# Patient Record
Sex: MALE | Race: WHITE | Employment: OTHER | URBAN - METROPOLITAN AREA
[De-identification: names, ages, dates, MRNs, and addresses within clinical notes are randomized per-mention and may not be internally consistent; named-entity substitution may affect disease eponyms.]

---

## 2018-01-18 ENCOUNTER — OFFICE VISIT (OUTPATIENT)
Dept: FAMILY MEDICINE CLINIC | Age: 58
End: 2018-01-18

## 2018-01-18 VITALS
OXYGEN SATURATION: 95 % | RESPIRATION RATE: 14 BRPM | TEMPERATURE: 97.8 F | HEART RATE: 80 BPM | SYSTOLIC BLOOD PRESSURE: 122 MMHG | HEIGHT: 72 IN | BODY MASS INDEX: 34.81 KG/M2 | DIASTOLIC BLOOD PRESSURE: 60 MMHG | WEIGHT: 257 LBS

## 2018-01-18 DIAGNOSIS — M79.10 MYALGIA: ICD-10-CM

## 2018-01-18 DIAGNOSIS — J10.1 INFLUENZA A: Primary | ICD-10-CM

## 2018-01-18 PROCEDURE — G8417 CALC BMI ABV UP PARAM F/U: HCPCS | Performed by: NURSE PRACTITIONER

## 2018-01-18 PROCEDURE — 99213 OFFICE O/P EST LOW 20 MIN: CPT | Performed by: NURSE PRACTITIONER

## 2018-01-18 PROCEDURE — 87804 INFLUENZA ASSAY W/OPTIC: CPT | Performed by: NURSE PRACTITIONER

## 2018-01-18 PROCEDURE — G8484 FLU IMMUNIZE NO ADMIN: HCPCS | Performed by: NURSE PRACTITIONER

## 2018-01-18 PROCEDURE — G8427 DOCREV CUR MEDS BY ELIG CLIN: HCPCS | Performed by: NURSE PRACTITIONER

## 2018-01-18 PROCEDURE — 3017F COLORECTAL CA SCREEN DOC REV: CPT | Performed by: NURSE PRACTITIONER

## 2018-01-18 PROCEDURE — 1036F TOBACCO NON-USER: CPT | Performed by: NURSE PRACTITIONER

## 2018-01-18 RX ORDER — MOMETASONE FUROATE 1 MG/G
CREAM TOPICAL
COMMUNITY
Start: 2017-11-09

## 2018-01-18 RX ORDER — OSELTAMIVIR PHOSPHATE 75 MG/1
75 CAPSULE ORAL 2 TIMES DAILY
Qty: 10 CAPSULE | Refills: 0 | Status: SHIPPED | OUTPATIENT
Start: 2018-01-18 | End: 2018-01-23

## 2018-01-18 RX ORDER — ALLOPURINOL 100 MG/1
TABLET ORAL
COMMUNITY
Start: 2018-01-10

## 2018-01-18 NOTE — PROGRESS NOTES
pain and palpitations. Gastrointestinal: Negative for abdominal distention, abdominal pain, anal bleeding, blood in stool, constipation, diarrhea, nausea and vomiting. Genitourinary: Negative for dysuria. Musculoskeletal: Positive for myalgias. Negative for joint pain and neck pain. Skin: Negative for rash. Neurological: Positive for headaches. Objective:   /60   Pulse 80   Temp 97.8 °F (36.6 °C) (Temporal)   Resp 14   Ht 6' (1.829 m)   Wt 257 lb (116.6 kg)   SpO2 95%   BMI 34.86 kg/m²     Physical Exam   Constitutional: He is oriented to person, place, and time. Vital signs are normal. He appears well-developed and well-nourished. No distress. HENT:   Head: Normocephalic and atraumatic. Right Ear: External ear and ear canal normal. No drainage, swelling or tenderness. Tympanic membrane is not erythematous. A middle ear effusion is present. Left Ear: External ear and ear canal normal. No drainage, swelling or tenderness. Tympanic membrane is not erythematous. A middle ear effusion is present. Nose: Mucosal edema and rhinorrhea present. No sinus tenderness. Right sinus exhibits no maxillary sinus tenderness and no frontal sinus tenderness. Left sinus exhibits no maxillary sinus tenderness and no frontal sinus tenderness. Mouth/Throat: Uvula is midline and mucous membranes are normal. Posterior oropharyngeal edema and posterior oropharyngeal erythema present. No oropharyngeal exudate. Eyes: Conjunctivae, EOM and lids are normal. Pupils are equal, round, and reactive to light. Neck: Normal range of motion and full passive range of motion without pain. Neck supple. Cardiovascular: Normal rate, regular rhythm, S1 normal, S2 normal, normal heart sounds, intact distal pulses and normal pulses. No murmur heard. Pulmonary/Chest: Effort normal and breath sounds normal. No accessory muscle usage. No respiratory distress. He has no decreased breath sounds. He has no wheezes.  He

## 2018-01-19 LAB
INFLUENZA A ANTIBODY: ABNORMAL
INFLUENZA B ANTIBODY: ABNORMAL

## 2018-01-19 ASSESSMENT — ENCOUNTER SYMPTOMS
BLOOD IN STOOL: 0
CONSTIPATION: 0
NAUSEA: 0
VOMITING: 0
COUGH: 1
SINUS PAIN: 1
DIARRHEA: 0
SORE THROAT: 1
ABDOMINAL PAIN: 0
SHORTNESS OF BREATH: 0
ABDOMINAL DISTENTION: 0
RHINORRHEA: 1
WHEEZING: 0
SWOLLEN GLANDS: 1
ANAL BLEEDING: 0
CHEST TIGHTNESS: 0

## 2019-05-23 ENCOUNTER — OFFICE VISIT (OUTPATIENT)
Dept: PULMONOLOGY | Age: 59
End: 2019-05-23
Payer: COMMERCIAL

## 2019-05-23 VITALS
SYSTOLIC BLOOD PRESSURE: 120 MMHG | OXYGEN SATURATION: 96 % | RESPIRATION RATE: 16 BRPM | HEART RATE: 77 BPM | BODY MASS INDEX: 34.67 KG/M2 | HEIGHT: 72 IN | TEMPERATURE: 97.8 F | DIASTOLIC BLOOD PRESSURE: 76 MMHG | WEIGHT: 256 LBS

## 2019-05-23 DIAGNOSIS — Z99.89 OSA ON CPAP: Primary | ICD-10-CM

## 2019-05-23 DIAGNOSIS — E66.9 OBESITY (BMI 30-39.9): ICD-10-CM

## 2019-05-23 DIAGNOSIS — G47.33 OSA ON CPAP: Primary | ICD-10-CM

## 2019-05-23 PROBLEM — L50.3 DERMATOGRAPHIA: Status: ACTIVE | Noted: 2019-01-11

## 2019-05-23 PROCEDURE — 99204 OFFICE O/P NEW MOD 45 MIN: CPT | Performed by: INTERNAL MEDICINE

## 2019-05-23 RX ORDER — FEXOFENADINE HCL 180 MG/1
180 TABLET ORAL
COMMUNITY
Start: 2019-01-11

## 2019-05-23 ASSESSMENT — ENCOUNTER SYMPTOMS
SORE THROAT: 0
COUGH: 0
RHINORRHEA: 0
DIARRHEA: 0
CHEST TIGHTNESS: 0
SHORTNESS OF BREATH: 0
WHEEZING: 0
ABDOMINAL PAIN: 0
NAUSEA: 0
VOICE CHANGE: 0
VOMITING: 0
EYE ITCHING: 0

## 2019-05-23 NOTE — PROGRESS NOTES
Subjective:     Nathaly Earl is a 60 y.o. male who complains today of:     Chief Complaint   Patient presents with   [facility] Doctor     referred by Dr. Brandie Vasquez for RIKKI. HPI  Patient is using CPAP with  9  centimeters of H2O with heated humidity. He is on CPAP for last  5 yrs. He want new CPAP . He said he ha CPAP over 11 yrs old. He need CPAP supply. Patient is using CPAP for about   7-8 hours every night. Patient is using CPAP with  Full face   Mask. Patient said  sleep is restful with the CPAP use. Patient is compliant with CPAP therapy and benefiting with CPAP use. No snoring with CPAP use. No early morning headache. No complaint of daytime sleepiness or tiredness with CPAP use. Patient denies taking naps. No sleepiness with driving. Patient denies difficulty falling asleep or staying asleep. Allergies:  Sulfate  History reviewed. No pertinent past medical history. History reviewed. No pertinent surgical history. History reviewed. No pertinent family history.   Social History     Socioeconomic History    Marital status:      Spouse name: Not on file    Number of children: Not on file    Years of education: Not on file    Highest education level: Not on file   Occupational History    Not on file   Social Needs    Financial resource strain: Not on file    Food insecurity:     Worry: Not on file     Inability: Not on file    Transportation needs:     Medical: Not on file     Non-medical: Not on file   Tobacco Use    Smoking status: Never Smoker    Smokeless tobacco: Never Used   Substance and Sexual Activity    Alcohol use: Not on file    Drug use: Not on file    Sexual activity: Not on file   Lifestyle    Physical activity:     Days per week: Not on file     Minutes per session: Not on file    Stress: Not on file   Relationships    Social connections:     Talks on phone: Not on file     Gets together: Not on file     Attends Syn[employer] service: Not on file sleep on  sides. Avoid  sleep deprivation. Explained sleep hygiene. Advice to avoid Alcohol and sedative. Time spend over 25 min. Face to face. with greater than 50 % time with counseling regarding CPAP therapy. 2. Obesity (BMI 30-39. 9)  Patient patient is advised try to lose weight. obesity related risk explained to the patient ,  Current weight:  256 lb (116.1 kg) Lbs. BMI:  Body mass index is 34.72 kg/m². Return in about 6 months (around 11/23/2019) for leonel.       Colon Mohs, MD

## 2019-12-05 ENCOUNTER — OFFICE VISIT (OUTPATIENT)
Dept: PULMONOLOGY | Age: 59
End: 2019-12-05
Payer: COMMERCIAL

## 2019-12-05 VITALS
BODY MASS INDEX: 34.4 KG/M2 | HEART RATE: 72 BPM | OXYGEN SATURATION: 95 % | RESPIRATION RATE: 16 BRPM | HEIGHT: 72 IN | TEMPERATURE: 97.8 F | WEIGHT: 254 LBS | SYSTOLIC BLOOD PRESSURE: 126 MMHG | DIASTOLIC BLOOD PRESSURE: 64 MMHG

## 2019-12-05 DIAGNOSIS — Z99.89 OSA ON CPAP: Primary | ICD-10-CM

## 2019-12-05 DIAGNOSIS — E66.9 OBESITY (BMI 30-39.9): ICD-10-CM

## 2019-12-05 DIAGNOSIS — G47.33 OSA ON CPAP: Primary | ICD-10-CM

## 2019-12-05 PROCEDURE — 3017F COLORECTAL CA SCREEN DOC REV: CPT | Performed by: INTERNAL MEDICINE

## 2019-12-05 PROCEDURE — 1036F TOBACCO NON-USER: CPT | Performed by: INTERNAL MEDICINE

## 2019-12-05 PROCEDURE — 99214 OFFICE O/P EST MOD 30 MIN: CPT | Performed by: INTERNAL MEDICINE

## 2019-12-05 PROCEDURE — G8427 DOCREV CUR MEDS BY ELIG CLIN: HCPCS | Performed by: INTERNAL MEDICINE

## 2019-12-05 PROCEDURE — G8417 CALC BMI ABV UP PARAM F/U: HCPCS | Performed by: INTERNAL MEDICINE

## 2019-12-05 PROCEDURE — G8484 FLU IMMUNIZE NO ADMIN: HCPCS | Performed by: INTERNAL MEDICINE

## 2019-12-05 RX ORDER — PANTOPRAZOLE SODIUM 40 MG/1
TABLET, DELAYED RELEASE ORAL
Refills: 1 | COMMUNITY
Start: 2019-11-21 | End: 2021-07-28 | Stop reason: ALTCHOICE

## 2019-12-05 ASSESSMENT — ENCOUNTER SYMPTOMS
COUGH: 0
ABDOMINAL PAIN: 0
SHORTNESS OF BREATH: 0
SORE THROAT: 0
VOICE CHANGE: 0
VOMITING: 0
DIARRHEA: 0
WHEEZING: 0
CHEST TIGHTNESS: 0
RHINORRHEA: 0
NAUSEA: 0
EYE ITCHING: 0

## 2020-06-04 ENCOUNTER — VIRTUAL VISIT (OUTPATIENT)
Dept: PULMONOLOGY | Age: 60
End: 2020-06-04
Payer: COMMERCIAL

## 2020-06-04 VITALS — WEIGHT: 228 LBS | HEIGHT: 72 IN | BODY MASS INDEX: 30.88 KG/M2

## 2020-06-04 PROCEDURE — 99214 OFFICE O/P EST MOD 30 MIN: CPT | Performed by: INTERNAL MEDICINE

## 2020-06-04 ASSESSMENT — ENCOUNTER SYMPTOMS
COUGH: 0
CHEST TIGHTNESS: 0
DIARRHEA: 0
SHORTNESS OF BREATH: 0
SORE THROAT: 0
EYE ITCHING: 0
VOICE CHANGE: 0
ABDOMINAL PAIN: 0
WHEEZING: 0
VOMITING: 0
NAUSEA: 0
RHINORRHEA: 0

## 2020-06-04 NOTE — PROGRESS NOTES
Provider, MD   VITAMIN E PO Take by mouth  Historical Provider, MD   MAGNESIUM PO Take by mouth  Historical Provider, MD   CPAP Machine MISC by Does not apply route New CPAP at 9 cm      Dx RIKKI. Alirio Thacker MD   Respiratory Therapy Supplies PANDA New CPAP mask and supplies  Alirio Thacker MD   mometasone (ELOCON) 0.1 % cream   Historical Provider, MD   hyoscyamine (LEVSIN/SL) 125 MCG sublingual tablet   Historical Provider, MD   allopurinol (ZYLOPRIM) 100 MG tablet   Historical Provider, MD       Social History     Tobacco Use    Smoking status: Never Smoker    Smokeless tobacco: Never Used   Substance Use Topics    Alcohol use: Not on file    Drug use: Not on file        Allergies   Allergen Reactions    Bee Venom Anaphylaxis    Monosodium Glutamate Anaphylaxis    Sulfate Shortness Of Breath       PHYSICAL EXAMINATION:  [ INSTRUCTIONS:  \"[x]\" Indicates a positive item  \"[]\" Indicates a negative item  -- DELETE ALL ITEMS NOT EXAMINED]  Vital Signs: (As obtained by patient/caregiver or practitioner observation)    Blood pressure-  Heart rate-    Respiratory rate-    Temperature-  Pulse oximetry-     Constitutional: [x] Appears well-developed and well-nourished [x] No apparent distress      [] Abnormal-   Mental status  [x] Alert and awake  [x] Oriented to person/place/time [x]Able to follow commands      Eyes:  EOM    []  Normal  [] Abnormal-  Sclera  []  Normal  [] Abnormal -         Discharge []  None visible  [] Abnormal -    HENT:   [x] Normocephalic, atraumatic.   [] Abnormal   [] Mouth/Throat: Mucous membranes are moist.     External Ears [] Normal  [] Abnormal-     Neck: [x] No visualized mass     Pulmonary/Chest: [x] Respiratory effort normal.  [x] No visualized signs of difficulty breathing or respiratory distress        [] Abnormal-      Musculoskeletal:   [] Normal gait with no signs of ataxia         [] Normal range of motion of neck        [] Abnormal-       Neurological:        [x] No Facial Asymmetry (Cranial nerve 7 motor function) (limited exam to video visit)          [] No gaze palsy        [] Abnormal-         Skin:        [x] No significant exanthematous lesions or discoloration noted on facial skin         [] Abnormal-            Psychiatric:       [x] Normal Affect [x] No Hallucinations        [] Abnormal-     Other pertinent observable physical exam findings-     ASSESSMENT/PLAN:  1. RIKKI on CPAP  He is using CPAP with  9 centimeters of H2O with heated humidity. He is using CPAP for about  7-8 hours every night. He is using CPAP with Full face Mask. He said  sleep is restful with the CPAP use. He is compliant with CPAP therapy and benefiting with CPAP use. No snoring with CPAP use. Continue CPAP therapy as before    Counseling: CPAP/BiPAP uses, patient advised to use CPAP at least 5-6 hours every night. Driving: patient is advised for extreme caution when driving or operating machinery if there is a feeling of drowsiness, especially while driving it is preferable to stop driving and take a brief nap. Sleep hygiene:Avoid supine sleep, sleep on  sides. Avoid  sleep deprivation. Explained sleep hygiene. Advice to avoid Alcohol and sedative    2. Obesity (BMI 30-39. 9)  Patient patient is advised try to lose weight. obesity related risk explained to the patient ,  Current weight:  228 lb (103.4 kg) Lbs. BMI:  Body mass index is 30.92 kg/m². Suggested weight control approaches, including dietary changes , exercise, behavioral modification. Return in about 6 months (around 12/4/2020). Cathryn Camilo is a 63 y.o. male being evaluated by a Virtual Visit (video visit) encounter to address concerns as mentioned above. A caregiver was present when appropriate. Due to this being a TeleHealth encounter (During [facility]-15 public health emergency), evaluation of the following organ systems was limited: Vitals/Constitutional/EENT/Resp/CV/GI//MS/Neuro/Skin/Heme-Lymph-Imm.   Pursuant to the

## 2020-12-03 ENCOUNTER — VIRTUAL VISIT (OUTPATIENT)
Dept: PULMONOLOGY | Age: 60
End: 2020-12-03
Payer: COMMERCIAL

## 2020-12-03 PROCEDURE — 99214 OFFICE O/P EST MOD 30 MIN: CPT | Performed by: INTERNAL MEDICINE

## 2020-12-03 ASSESSMENT — ENCOUNTER SYMPTOMS
EYE ITCHING: 0
WHEEZING: 0
VOMITING: 0
VOICE CHANGE: 0
ABDOMINAL PAIN: 0
COUGH: 0
SORE THROAT: 0
RHINORRHEA: 0
NAUSEA: 0
CHEST TIGHTNESS: 0
SHORTNESS OF BREATH: 0
DIARRHEA: 0

## 2020-12-03 NOTE — PROGRESS NOTES
12/3/2020    TELEHEALTH EVALUATION -- Audio/Visual (During [facility]OP[facility]-18 public health emergency)    HPI:    Media Quivers (:  1960) has requested an audio/video evaluation for the following concern(s):    He is using CPAP with  9 centimeters of H2O with heated humidity. He is using CPAP for about   8  hours every night. He is using CPAP with  Full face Mask. He said  sleep is restful with the CPAP use. He is compliant with CPAP therapy and benefiting with CPAP use. No snoring with CPAP use. .    No complaint of daytime sleepiness or tiredness with CPAP use. He denies taking naps. No sleepiness with driving. He denies difficulty falling asleep or staying asleep. He does not need CPAP supply. Review of Systems   Constitutional: Negative for chills, diaphoresis, fatigue and fever. HENT: Negative for congestion, mouth sores, nosebleeds, postnasal drip, rhinorrhea, sneezing, sore throat and voice change. Eyes: Negative for itching and visual disturbance. Respiratory: Negative for cough, chest tightness, shortness of breath and wheezing. Cardiovascular: Negative. Negative for chest pain, palpitations and leg swelling. Gastrointestinal: Negative for abdominal pain, diarrhea, nausea and vomiting. Genitourinary: Negative for difficulty urinating and hematuria. Musculoskeletal: Negative for arthralgias, joint swelling and myalgias. Skin: Negative for rash. Allergic/Immunologic: Negative for environmental allergies. Neurological: Negative for dizziness, tremors, weakness and headaches. Psychiatric/Behavioral: Positive for sleep disturbance. Negative for behavioral problems. Prior to Visit Medications    Medication Sig Taking?  Authorizing Provider   pantoprazole (PROTONIX) 40 MG tablet Take 1 tablet by mouth once a day as directed  Historical Provider, MD   Respiratory Therapy Supplies PANDA [employer] CPAP mask and supplies  Susie Gutierrez MD   fexofenadine (ALLEGRA) 180 MG tablet Take 180 mg by mouth  Historical Provider, MD   VITAMIN E PO Take by mouth  Historical Provider, MD   MAGNESIUM PO Take by mouth  Historical Provider, MD   CPAP Machine MISC by Does not apply route New CPAP at 9 cm      Dx RIKKI. Nicole Davis MD   Respiratory Therapy Supplies PANDA New CPAP mask and supplies  Nicole Davis MD   mometasone (ELOCON) 0.1 % cream   Historical Provider, MD   hyoscyamine (LEVSIN/SL) 125 MCG sublingual tablet   Historical Provider, MD   allopurinol (ZYLOPRIM) 100 MG tablet   Historical Provider, MD       Social History     Tobacco Use    Smoking status: Never Smoker    Smokeless tobacco: Never Used   Substance Use Topics    Alcohol use: Not on file    Drug use: Not on file        Allergies   Allergen Reactions    Bee Venom Anaphylaxis    Monosodium Glutamate Anaphylaxis    Sulfate Shortness Of Breath       PHYSICAL EXAMINATION:  [ INSTRUCTIONS:  \"[x]\" Indicates a positive item  \"[]\" Indicates a negative item  -- DELETE ALL ITEMS NOT EXAMINED]  Vital Signs: (As obtained by patient/caregiver or practitioner observation)    Blood pressure-  Heart rate-    Respiratory rate-    Temperature-  Pulse oximetry-     Constitutional: [x] Appears well-developed and well-nourished [x] No apparent distress      [] Abnormal-   Mental status  [x] Alert and awake  [x] Oriented to person/place/time [x]Able to follow commands      Eyes:  EOM    [x]  Normal  [] Abnormal-  Sclera  []  Normal  [] Abnormal -         Discharge []  None visible  [] Abnormal -    HENT:   [x] Normocephalic, atraumatic.   [] Abnormal   [] Mouth/Throat: Mucous membranes are moist.     External Ears [x] Normal  [] Abnormal-     Neck: [x] No visualized mass     Pulmonary/Chest: [x] Respiratory effort normal.  [x] No visualized signs of difficulty breathing or respiratory distress        [] Abnormal-      Musculoskeletal:   [] Normal gait with no signs of ataxia         [] Normal range of motion of neck        [] Abnormal- Neurological:        [x] No Facial Asymmetry (Cranial nerve 7 motor function) (limited exam to video visit)          [] No gaze palsy        [] Abnormal-         Skin:        [x] No significant exanthematous lesions or discoloration noted on facial skin         [] Abnormal-            Psychiatric:       [x] Normal Affect [x] No Hallucinations        [] Abnormal-     Other pertinent observable physical exam findings-     ASSESSMENT/PLAN:  1. RIKKI on CPAP  He is using CPAP with  9 centimeters of H2O with heated humidity. He is using CPAP for about   8  hours every night. He is using CPAP with  Full face Mask. He said  sleep is restful with the CPAP use. He is compliant with CPAP therapy and benefiting with CPAP use. No snoring with CPAP use. .  Continue CPAp as before , no need for CPAP supply . Counseling: CPAP/BiPAP uses, He advised to use CPAP at least 5-6 hours every night. Driving: He is advised for extreme caution when driving or operating machinery if there is a feeling of drowsiness, especially while driving it is preferable to stop driving and take a brief nap. Sleep hygiene:Avoid supine sleep, sleep on  sides. Avoid  sleep deprivation. Explained sleep hygiene. Advice to avoid Alcohol and sedative    Time spend over 25 min. Face to face. with greater than 50 % time with counseling regarding CPAP therapy. 2. Obesity (BMI 30-39. 9)  He is advised try to lose weight. obesity related risk explained to the patient ,  Suggested weight control approaches, including dietary changes , exercise, behavioral modification. Return in about 6 months (around 6/3/2021) for rikki. Eric Albert is a 63 y.o. male being evaluated by a Virtual Visit (video visit) encounter to address concerns as mentioned above. A caregiver was present when appropriate.  Due to this being a TeleHealth encounter (During [facility]- public health emergency), evaluation of the following organ systems was limited:

## 2021-04-10 ENCOUNTER — HOSPITAL ENCOUNTER (EMERGENCY)
Age: 61
Discharge: HOME | End: 2021-04-10
Attending: EMERGENCY MEDICINE
Payer: COMMERCIAL

## 2021-04-10 VITALS
WEIGHT: 225 LBS | SYSTOLIC BLOOD PRESSURE: 142 MMHG | TEMPERATURE: 98.4 F | HEART RATE: 94 BPM | DIASTOLIC BLOOD PRESSURE: 79 MMHG | OXYGEN SATURATION: 97 % | HEIGHT: 72 IN | RESPIRATION RATE: 18 BRPM | BODY MASS INDEX: 30.48 KG/M2

## 2021-04-10 DIAGNOSIS — H16.002 CORNEAL ULCER OF LEFT EYE: Primary | ICD-10-CM

## 2021-04-10 PROCEDURE — 6370000000 HC RX 637 (ALT 250 FOR IP): Performed by: EMERGENCY MEDICINE

## 2021-04-10 PROCEDURE — 99283 EMERGENCY DEPT VISIT LOW MDM: CPT

## 2021-04-10 PROCEDURE — 6360000002 HC RX W HCPCS: Performed by: EMERGENCY MEDICINE

## 2021-04-10 PROCEDURE — 90471 IMMUNIZATION ADMIN: CPT | Performed by: EMERGENCY MEDICINE

## 2021-04-10 PROCEDURE — 90715 TDAP VACCINE 7 YRS/> IM: CPT | Performed by: EMERGENCY MEDICINE

## 2021-04-10 RX ORDER — CIPROFLOXACIN HYDROCHLORIDE 3.5 MG/ML
1 SOLUTION/ DROPS TOPICAL
Qty: 120 DROP | Refills: 0 | Status: SHIPPED | OUTPATIENT
Start: 2021-04-10 | End: 2021-04-20

## 2021-04-10 RX ORDER — HYDROCODONE BITARTRATE AND ACETAMINOPHEN 5; 325 MG/1; MG/1
1 TABLET ORAL EVERY 4 HOURS PRN
Qty: 18 TABLET | Refills: 0 | Status: SHIPPED | OUTPATIENT
Start: 2021-04-10 | End: 2021-04-13

## 2021-04-10 RX ORDER — HYDROCODONE BITARTRATE AND ACETAMINOPHEN 5; 325 MG/1; MG/1
1 TABLET ORAL ONCE
Status: COMPLETED | OUTPATIENT
Start: 2021-04-10 | End: 2021-04-10

## 2021-04-10 RX ADMIN — TETANUS TOXOID, REDUCED DIPHTHERIA TOXOID AND ACELLULAR PERTUSSIS VACCINE, ADSORBED 0.5 ML: 5; 2.5; 8; 8; 2.5 SUSPENSION INTRAMUSCULAR at 16:58

## 2021-04-10 RX ADMIN — FLUORESCEIN SODIUM 1 MG: 1 STRIP OPHTHALMIC at 17:11

## 2021-04-10 RX ADMIN — HYDROCODONE BITARTRATE AND ACETAMINOPHEN 1 TABLET: 5; 325 TABLET ORAL at 16:48

## 2021-04-10 ASSESSMENT — PAIN SCALES - GENERAL: PAINLEVEL_OUTOF10: 1

## 2021-04-10 ASSESSMENT — PAIN DESCRIPTION - FREQUENCY: FREQUENCY: CONTINUOUS

## 2021-04-10 NOTE — ED PROVIDER NOTES
 Sulfate Shortness Of Breath       FAMILY HISTORY    History reviewed. No pertinent family history. SOCIAL HISTORY    Social History     Socioeconomic History    Marital status:      Spouse name: None    Number of children: None    Years of education: None    Highest education level: None   Occupational History    None   Social Needs    Financial resource strain: None    Food insecurity     Worry: None     Inability: None    Transportation needs     Medical: None     Non-medical: None   Tobacco Use    Smoking status: Never Smoker    Smokeless tobacco: Never Used   Substance and Sexual Activity    Alcohol use:  Yes     Alcohol/week: 7.0 standard drinks     Types: 7 Glasses of wine per week    Drug use: None    Sexual activity: None   Lifestyle    Physical activity     Days per week: None     Minutes per session: None    Stress: None   Relationships    Social connections     Talks on phone: None     Gets together: None     Attends Confucianism service: None     Active member of club or organization: None     Attends meetings of clubs or organizations: None     Relationship status: None    Intimate partner violence     Fear of current or ex partner: None     Emotionally abused: None     Physically abused: None     Forced sexual activity: None   Other Topics Concern    None   Social History Narrative    None       REVIEW OF SYSTEMS    Constitutional:  Denies fever, chills, weight loss or weakness   Eyes: Complains of photophobia and pain in the left eye  HENT:  Denies sore throat or ear pain   Respiratory:  Denies cough or shortness of breath   Cardiovascular:  Denies chest pain, palpitations or swelling   GI:  Denies abdominal pain, nausea, vomiting, or diarrhea   Musculoskeletal:  Denies back pain   Skin:  Denies rash   Neurologic:  Denies headache, focal weakness or sensory changes   Endocrine:  Denies polyuria or polydypsia   Lymphatic:  Denies swollen glands   Psychiatric:  Denies depression, suicidal ideation or homicidal ideation   All systems negative except as marked. PHYSICAL EXAM    VITAL SIGNS: BP (!) 142/79   Pulse 94   Temp 98.4 °F (36.9 °C) (Temporal)   Resp 18   Ht 6' (1.829 m)   Wt 225 lb (102.1 kg)   SpO2 97%   BMI 30.52 kg/m²    Constitutional:  Well developed, Well nourished, mild acute distress, Non-toxic appearance. HENT:  Normocephalic, Atraumatic, Bilateral external ears normal, Oropharynx moist, No oral exudates, Nose normal. Neck- Normal range of motion, No tenderness, Supple, No stridor. Eyes:  PERRL, EOMI, left eye corneal ulcer present from 10:00 to 12 o'clock position, please see picture, conjunctiva injected, no discharge. No foreign body present  Respiratory:  Normal breath sounds, No respiratory distress, No wheezing, No chest tenderness. Cardiovascular:  Normal heart rate, Normal rhythm, No murmurs, No rubs, No gallops. GI:  Bowel sounds normal, Soft, No tenderness, No masses, No pulsatile masses. :  No CVA tenderness. Musculoskeletal:  Intact distal pulses, No edema, No tenderness, No cyanosis, No clubbing. Good range of motion in all major joints. No tenderness to palpation or major deformities noted. Back- No tenderness. Integument:  Warm, Dry, No erythema, No rash. Lymphatic:  No lymphadenopathy noted. Neurologic:  Alert & oriented x 3, Normal motor function, Normal sensory function, No focal deficits noted. Psychiatric:  Affect normal, Judgment normal, Mood normal.       REEVALUATION   Pain control adequate  Counseled patient to follow-up with ophthalmology next couple days.     Labs  Labs Reviewed - No data to display          Summation      Patient Course:     ED Medications administered this visit:    Medications   fluorescein ophthalmic strip 1 mg (has no administration in time range)   HYDROcodone-acetaminophen (NORCO) 5-325 MG per tablet 1 tablet (1 tablet Oral Given 4/10/21 4867)   Tetanus-Diphth-Acell Pertussis (BOOSTRIX) injection 0.5 mL (0.5 mLs Intramuscular Given 4/10/21 5182)       New Prescriptions from this visit:    New Prescriptions    CIPROFLOXACIN (CILOXAN) 0.3 % OPHTHALMIC SOLUTION    Place 1 drop into the left eye every 2 hours for 10 days    HYDROCODONE-ACETAMINOPHEN (NORCO) 5-325 MG PER TABLET    Take 1 tablet by mouth every 4 hours as needed for Pain for up to 3 days. Intended supply: 3 days. Take lowest dose possible to manage pain       Follow-up:  Wiley Ortiz [place]  [id number]  [phone]    Call in 1 day      [place] [id number] [phone]    Call in 1 day          Final Impression:   1.  Corneal ulcer of left eye               (Please note that portions of this note were completed with a voice recognition program.  Efforts were made to edit the dictations but occasionally words are mis-transcribed.)          Heaven Ortega MD  04/10/21 9818

## 2021-04-10 NOTE — ED TRIAGE NOTES
Patient to the ED today after he was working in his crawl space at home, patient states he had a lot of debris on his head and clothing and when he stood up, something fell into his left eye.

## 2021-06-04 ENCOUNTER — VIRTUAL VISIT (OUTPATIENT)
Dept: PULMONOLOGY | Age: 61
End: 2021-06-04
Payer: COMMERCIAL

## 2021-06-04 DIAGNOSIS — Z99.89 OSA ON CPAP: Primary | ICD-10-CM

## 2021-06-04 DIAGNOSIS — G47.33 OSA ON CPAP: Primary | ICD-10-CM

## 2021-06-04 DIAGNOSIS — E66.9 OBESITY (BMI 30-39.9): ICD-10-CM

## 2021-06-04 PROCEDURE — 99214 OFFICE O/P EST MOD 30 MIN: CPT | Performed by: INTERNAL MEDICINE

## 2021-06-04 ASSESSMENT — ENCOUNTER SYMPTOMS
SHORTNESS OF BREATH: 0
VOICE CHANGE: 0
SORE THROAT: 0
ABDOMINAL PAIN: 0
COUGH: 0
RHINORRHEA: 0
VOMITING: 0
CHEST TIGHTNESS: 0
DIARRHEA: 0
NAUSEA: 0
WHEEZING: 0
EYE ITCHING: 0

## 2021-06-04 NOTE — PROGRESS NOTES
2021    TELEHEALTH EVALUATION -- Audio/Visual (During WRGCZ-87 public health emergency)    HPI:    Ana Tinajero (:  1960) has requested an audio/video evaluation for the following concern(s):    He is using CPAP with  9   centimeters of H2O with heated humidity. He is using CPAP for about  8-9   hours every night. He is using CPAP with  Full face  Mask. He said  sleep is restful with the CPAP use. He is compliant with CPAP therapy and benefiting with CPAP use. No snoring with CPAP use. No complaint of daytime sleepiness or tiredness with CPAP use. He denies taking naps. No sleepiness with driving. He denies difficulty falling asleep or staying asleep. I reviewed compliance report with patient regarding CPAP therapy. He is using  CPAP for 30 days out of 30 days  Average usage of days used is 8hours and 9 min , average AHI 3.4 with CPAP use. Review of Systems   Constitutional: Negative for chills, diaphoresis, fatigue and fever. HENT: Negative for congestion, mouth sores, nosebleeds, postnasal drip, rhinorrhea, sneezing, sore throat and voice change. Eyes: Negative for itching and visual disturbance. Respiratory: Negative for cough, chest tightness, shortness of breath and wheezing. Cardiovascular: Negative. Negative for chest pain, palpitations and leg swelling. Gastrointestinal: Negative for abdominal pain, diarrhea, nausea and vomiting. Genitourinary: Negative for difficulty urinating and hematuria. Musculoskeletal: Negative for arthralgias, joint swelling and myalgias. Skin: Negative for rash. Allergic/Immunologic: Negative for environmental allergies. Neurological: Negative for dizziness, tremors, weakness and headaches. Psychiatric/Behavioral: Positive for sleep disturbance. Negative for behavioral problems. Prior to Visit Medications    Medication Sig Taking?  Authorizing Provider   pantoprazole (PROTONIX) 40 MG tablet Take 1 tablet by mouth once a day as directed  Historical Provider, MD   Respiratory Therapy Supplies PANDA New CPAP mask and supplies  Susanna Veloz MD   fexofenadine (ALLEGRA) 180 MG tablet Take 180 mg by mouth  Historical Provider, MD   VITAMIN E PO Take by mouth  Historical Provider, MD   MAGNESIUM PO Take by mouth  Historical Provider, MD   CPAP Machine MISC by Does not apply route New CPAP at 9 cm      Dx RIKKI. Susanna Veloz MD   Respiratory Therapy Supplies PANDA New CPAP mask and supplies  Susanna Veloz MD   mometasone (ELOCON) 0.1 % cream   Historical Provider, MD   hyoscyamine (LEVSIN/SL) 125 MCG sublingual tablet   Historical Provider, MD   allopurinol (ZYLOPRIM) 100 MG tablet   Historical Provider, MD       Social History     Tobacco Use    Smoking status: Never Smoker    Smokeless tobacco: Never Used   Substance Use Topics    Alcohol use: Yes     Alcohol/week: 7.0 standard drinks     Types: 7 Glasses of wine per week    Drug use: Not on file        Allergies   Allergen Reactions    Bee Venom Anaphylaxis    Monosodium Glutamate Anaphylaxis    Sulfate Shortness Of Breath       PHYSICAL EXAMINATION:  [ INSTRUCTIONS:  \"[x]\" Indicates a positive item  \"[]\" Indicates a negative item  -- DELETE ALL ITEMS NOT EXAMINED]  Vital Signs: (As obtained by patient/caregiver or practitioner observation)    Blood pressure-  Heart rate-    Respiratory rate-    Temperature-  Pulse oximetry-     Constitutional: [x] Appears well-developed and well-nourished [x] No apparent distress      [] Abnormal-   Mental status  [x] Alert and awake  [x] Oriented to person/place/time [x]Able to follow commands      Eyes:  EOM    [x]  Normal  [] Abnormal-  Sclera  []  Normal  [] Abnormal -         Discharge []  None visible  [] Abnormal -    HENT:   [x] Normocephalic, atraumatic.   [] Abnormal   [] Mouth/Throat: Mucous membranes are moist.     External Ears [] Normal  [] Abnormal-     Neck: [x] No visualized mass     Pulmonary/Chest: [x] Respiratory effort normal.  [x] No visualized signs of difficulty breathing or respiratory distress        [] Abnormal-      Musculoskeletal:   [] Normal gait with no signs of ataxia         [] Normal range of motion of neck        [] Abnormal-       Neurological:        [] No Facial Asymmetry (Cranial nerve 7 motor function) (limited exam to video visit)          [] No gaze palsy        [] Abnormal-         Skin:        [x] No significant exanthematous lesions or discoloration noted on facial skin         [] Abnormal-            Psychiatric:       [x] Normal Affect [x] No Hallucinations        [] Abnormal-     Other pertinent observable physical exam findings-     ASSESSMENT/PLAN:  1. RIKKI on CPAP  He is using CPAP with  9   centimeters of H2O with heated humidity. He is using CPAP for about  8-9   hours every night. He is using CPAP with  Full face  Mask. He said  sleep is restful with the CPAP use. He is compliant with CPAP therapy and benefiting with CPAP use. No snoring with CPAP use. No complaint of daytime sleepiness or tiredness with CPAP use. no need for CPAP supply     I reviewed compliance report with patient regarding CPAP therapy. He is using  CPAP for 30 days out of 30 days  Average usage of days used is 8hours and 9 min , average AHI 3.4 with CPAP use. Counseling: CPAP/BiPAP uses, He advised to use CPAP at least 5-6 hours every night. Driving: He is advised for extreme caution when driving or operating machinery if there is a feeling of drowsiness, especially while driving it is preferable to stop driving and take a brief nap. Sleep hygiene:Avoid supine sleep, sleep on  sides. Avoid  sleep deprivation. Explained sleep hygiene. Advice to avoid Alcohol and sedative    Time spend over 30 min. Face to face. with greater than 50 % time with counseling regarding CPAP therapy. 2. Obesity (BMI 30-39. 9)  He is advised try to lose weight.  obesity related risk explained to the patient ,  Suggested weight control approaches, including dietary changes , exercise, behavioral modification. Return in about 6 months (around 12/4/2021) for leonel. Dennis Torres, was evaluated through a synchronous (real-time) audio-video encounter. The patient (or guardian if applicable) is aware that this is a billable service. Verbal consent to proceed has been obtained within the past 12 months. The visit was conducted pursuant to the emergency declaration under the [address] [place] [address], [address] [place] [address] authority and the [employer] and [employer] General Act. Patient identification was verified, and a caregiver was present when appropriate. The patient was located in a state where the provider was credentialed to provide care. Total time spent on this encounter: 32 min    --Long Lewis MD on 6/4/2021 at 8:46 AM    An electronic signature was used to authenticate this note.

## 2021-07-28 ENCOUNTER — OFFICE VISIT (OUTPATIENT)
Dept: INTERNAL MEDICINE | Age: 61
End: 2021-07-28
Payer: COMMERCIAL

## 2021-07-28 VITALS
RESPIRATION RATE: 14 BRPM | WEIGHT: 232 LBS | BODY MASS INDEX: 31.46 KG/M2 | DIASTOLIC BLOOD PRESSURE: 64 MMHG | SYSTOLIC BLOOD PRESSURE: 112 MMHG | HEART RATE: 75 BPM | TEMPERATURE: 98.1 F | OXYGEN SATURATION: 95 %

## 2021-07-28 DIAGNOSIS — L70.0 BLACKHEAD: Primary | ICD-10-CM

## 2021-07-28 PROBLEM — L73.9 FOLLICULITIS: Status: ACTIVE | Noted: 2021-07-28

## 2021-07-28 PROCEDURE — 99203 OFFICE O/P NEW LOW 30 MIN: CPT | Performed by: NURSE PRACTITIONER

## 2021-07-28 ASSESSMENT — PATIENT HEALTH QUESTIONNAIRE - PHQ9
2. FEELING DOWN, DEPRESSED OR HOPELESS: 0
1. LITTLE INTEREST OR PLEASURE IN DOING THINGS: 0
SUM OF ALL RESPONSES TO PHQ9 QUESTIONS 1 & 2: 0
SUM OF ALL RESPONSES TO PHQ QUESTIONS 1-9: 0

## 2021-07-28 NOTE — PROGRESS NOTES
Fely Minaya (:  1960) is a 57 y.o. male, New patient, here for evaluation of the following chief complaint(s): Other (red raised pimple like bump on mid upper back concern it may be a tick)      Vitals:    21 1545   BP: 112/64   Pulse: 75   Resp: 14   Temp: 98.1 °F (36.7 °C)   SpO2: 95%       ASSESSMENT/PLAN:  1. Blackhead  -     mupirocin (BACTROBAN) 2 % ointment; Apply topically 3 times daily for 10 days. , Disp-1 Tube, R-0, Normal  -     Culture, Wound; Future      Return if symptoms worsen or fail to improve. SUBJECTIVE/OBJECTIVE:    Other  This is a new problem. The current episode started yesterday (Pt states he thinks he might have a tic in his back. Tender with black spot in middle surrounded by area of redness). The problem occurs constantly. The problem has been unchanged. Associated symptoms include a rash. Pertinent negatives include no arthralgias, chest pain, chills, congestion, coughing, fatigue, fever, myalgias, neck pain or sore throat. Nothing aggravates the symptoms. He has tried nothing for the symptoms. Review of Systems   Constitutional: Negative for chills, fatigue and fever. HENT: Negative for congestion, facial swelling, sore throat and trouble swallowing. Respiratory: Negative for cough, chest tightness, shortness of breath and wheezing. Cardiovascular: Negative for chest pain and palpitations. Musculoskeletal: Negative for arthralgias, myalgias and neck pain. Skin: Positive for rash. Negative for pallor and wound. Physical Exam  Vitals reviewed. Constitutional:       General: He is not in acute distress. Appearance: Normal appearance. HENT:      Mouth/Throat:      Lips: Pink. Mouth: Mucous membranes are moist.      Pharynx: Oropharynx is clear. No pharyngeal swelling or posterior oropharyngeal erythema. Cardiovascular:      Rate and Rhythm: Normal rate and regular rhythm.       Heart sounds: Normal heart sounds, S1 normal and S2 normal.   Pulmonary:      Effort: Pulmonary effort is normal. No respiratory distress. Breath sounds: Normal air entry. Skin:     General: Skin is warm and dry. Findings: Acne present. Comments: Pt has maculopapular areas on his back. There is a single \"blackhead\" looking erythematous spot just below the right scapula; removed by applying gentle pressure with a small amount of pus expressed. Culture obtained. Neurological:      Mental Status: He is alert and oriented to person, place, and time. Psychiatric:         Mood and Affect: Mood normal.         Behavior: Behavior is cooperative. An electronic signature was used to authenticate this note.     --Ger Lee, APRN

## 2021-07-28 NOTE — PATIENT INSTRUCTIONS
Patient Education        Folliculitis: Care Instructions  Overview     Folliculitis (say \"fbw-FJR-cau-LY-tus\") is an inflammation of the pouches (follicles) in the skin where hair grows. It can occur on any part of the body, but it is most common on the scalp, face, armpits, and groin. Bacteria, such as those found in a hot tub, can cause folliculitis. But folliculitis can also be caused by other organisms, such as fungi or parasites. Folliculitis begins as a red, tender area near a strand of hair. The skin can itch or burn and may drain pus or blood. Sometimes folliculitis can lead to more serious skin infections. Your doctor usually can treat mild folliculitis with an antibiotic cream or ointment. If you have folliculitis on your scalp, you may use a medicated shampoo. Antibiotics you take as pills can treat infections deeper in the skin. Other treatments that may be used include antifungal and antiparasitic medicines. Folliculitis may be caused by ingrown hairs from shaving. One solution is to stop shaving. If that isn't an option, using an electric razor that doesn't shave so close may help. Laser treatment may also be an option. Laser treatment destroys the hair follicle so hair will no longer grow in the treated area. Follow-up care is a key part of your treatment and safety. Be sure to make and go to all appointments, and call your doctor if you are having problems. It's also a good idea to know your test results and keep a list of the medicines you take. How can you care for yourself at home? · Take your medicine exactly as prescribed. If your doctor prescribed antibiotics, take them as directed. Do not stop taking them just because you feel better. You need to take the full course of antibiotics. · To help with itching or pain, put a warm, moist cloth (like a clean washcloth) on the area for 5 to 10 minutes, 3 to 6 times a day. · Do not share your razor, towel, or washcloth.  That can spread folliculitis. · If folliculitis is caused by shaving, try to avoid shaving for at least a month. If that isn't an option, use an electric razor that doesn't shave so close. Or if you need a clean shave, use shaving cream or gel and always shave in the direction that the hair grows. When should you call for help? Call your doctor now or seek immediate medical care if:    · You have symptoms of infection, such as:  ? Increased pain, swelling, warmth, or redness. ? Red streaks leading from the area. ? Pus draining from the area. ? A fever. Watch closely for changes in your health, and be sure to contact your doctor if:    · You do not get better as expected. Where can you learn more? Go to https://[email or web address].[email or web address] [email or web address] org and sign in to your [employer] account. Enter M257 in the ImageTag box to learn more about \"Folliculitis: Care Instructions. \"     If you do not have an account, please click on the \"Sign Up Now\" link. Current as of: March 3, 2021               Content Version: 12.9  © 1220-5738 [employer]. Care instructions adapted under license by [facility] [place] ([facility]). If you have questions about a medical condition or this instruction, always ask your healthcare professional. [address] any warranty or liability for your use of this information. Patient Education        Patient Education        Acne: Care Instructions  Overview  Acne is a skin problem. It shows up as blackheads, whiteheads, and pimples. Acne most often affects the face, neck, and upper body. It occurs when oil and dead skin cells clog the skin's pores. Acne usually starts during the teen years and often lasts into adulthood. Gentle cleansing every day controls most mild acne. If home treatment doesn't work, your doctor may prescribe a cream, an antibiotic, or a stronger medicine called isotretinoin.  Sometimes birth control pills help women who have monthly acne flare-ups. Follow-up care is a key part of your treatment and safety. Be sure to make and go to all appointments, and call your doctor if you are having problems. It's also a good idea to know your test results and keep a list of the medicines you take. How can you care for yourself at home? · Gently wash your face 1 or 2 times a day with warm (not hot) water and a mild soap or cleanser. Always rinse well. · Use an over-the-counter lotion or gel that contains benzoyl peroxide. Start with a small amount of 2.5% benzoyl peroxide and increase the strength as needed. Benzoyl peroxide works well for acne, but you may need to use it for up to 2 months before your acne starts to improve. · Apply acne cream, lotion, or gel to all the places you get pimples, blackheads, or whiteheads, not just where you have them now. Follow the instructions carefully. If your skin gets too dry and scaly or red and sore, reduce the amount. For the best results, apply medicines as directed. Try not to miss doses. · Do not squeeze or pick pimples and blackheads. This can cause infection and scarring. · Use only oil-free makeup, sunscreen, and other skin care products that will not clog your pores. · Wash your hair every day, and try to keep it off your face and shoulders. Consider pinning it back or cutting it short. When should you call for help? Watch closely for changes in your health, and be sure to contact your doctor if:    · You have tried home treatment for 6 to 8 weeks and your acne is not better or gets worse. Your doctor may need to add to or change your treatment.     · Your pimples become large and hard or filled with fluid.     · Scars form after pimples heal.     · You feel sad or hopeless, lack energy, or have other signs of depression while you are taking the prescription medicine isotretinoin.     · You start to have other symptoms, such as facial hair growth in women or bone and muscle pain.    Where can you learn more?  Go to https://chpepiceweb.[email or web address] org and sign in to your [employer] account. Enter V108 in the [facility] box to learn more about \"Acne: Care Instructions. \"     If you do not have an account, please click on the \"Sign Up Now\" link. Current as of: March 3, 2021               Content Version: 12.9  © 1229-2642 Healthwise, [facility]. Care instructions adapted under license by [facility] ([facility]). If you have questions about a medical condition or this instruction, always ask your healthcare professional. [place] [address] any warranty or liability for your use of this information.

## 2021-07-29 DIAGNOSIS — L73.9 FOLLICULITIS: ICD-10-CM

## 2021-07-29 ASSESSMENT — ENCOUNTER SYMPTOMS
SORE THROAT: 0
COUGH: 0
CHEST TIGHTNESS: 0
TROUBLE SWALLOWING: 0
SHORTNESS OF BREATH: 0
FACIAL SWELLING: 0
WHEEZING: 0

## 2021-08-01 LAB
GRAM STAIN RESULT: NORMAL
WOUND/ABSCESS: NORMAL

## 2021-12-06 ENCOUNTER — TELEPHONE (OUTPATIENT)
Dept: PULMONOLOGY | Age: 61
End: 2021-12-06

## 2021-12-06 DIAGNOSIS — G47.33 OSA ON CPAP: Primary | ICD-10-CM

## 2021-12-06 DIAGNOSIS — Z99.89 OSA ON CPAP: Primary | ICD-10-CM

## 2021-12-06 NOTE — TELEPHONE ENCOUNTER
Pt called asking for more CPAP supplies from [facility].  Thanks  LV  6/4/2021  FU  12/13/2021

## 2021-12-13 ENCOUNTER — VIRTUAL VISIT (OUTPATIENT)
Dept: PULMONOLOGY | Age: 61
End: 2021-12-13
Payer: COMMERCIAL

## 2021-12-13 DIAGNOSIS — E66.9 OBESITY (BMI 30-39.9): ICD-10-CM

## 2021-12-13 DIAGNOSIS — Z99.89 OSA ON CPAP: Primary | ICD-10-CM

## 2021-12-13 DIAGNOSIS — G47.33 OSA ON CPAP: Primary | ICD-10-CM

## 2021-12-13 PROCEDURE — 99214 OFFICE O/P EST MOD 30 MIN: CPT | Performed by: INTERNAL MEDICINE

## 2021-12-13 ASSESSMENT — ENCOUNTER SYMPTOMS
COUGH: 0
ABDOMINAL PAIN: 0
NAUSEA: 0
EYE ITCHING: 0
SORE THROAT: 0
VOICE CHANGE: 0
SHORTNESS OF BREATH: 0
DIARRHEA: 0
WHEEZING: 0
VOMITING: 0
CHEST TIGHTNESS: 0
RHINORRHEA: 0

## 2021-12-13 NOTE — PROGRESS NOTES
2021    TELEHEALTH EVALUATION -- Audio/Visual (During IRSMA-23 public health emergency)    HPI:    Polo Noel (:  1960) has requested an audio/video evaluation for the following concern(s):    He is using CPAP with   9 centimeters of H2O with heated humidity. He is using CPAP for about  8  hours every night. He is using CPAP with  Full  faceMask. He said  sleep is restful with the CPAP use. He is compliant with CPAP therapy and benefiting with CPAP use. No snoring with CPAP use. No complaint of daytime sleepiness or tiredness with CPAP use. He denies taking naps. No sleepiness with driving. He denies difficulty falling asleep or staying asleep. I reviewed compliance report with patient regarding CPAP therapy. He is using  CPAP for  days out of 30 days. Average usage of days used is 7 hours and 50 min , average AHI 2.5 with CPAP use. Review of Systems   Constitutional: Negative for chills, diaphoresis, fatigue and fever. HENT: Negative for congestion, mouth sores, nosebleeds, postnasal drip, rhinorrhea, sneezing, sore throat and voice change. Mucus in back in AM    Eyes: Negative for itching and visual disturbance. Respiratory: Negative for cough, chest tightness, shortness of breath and wheezing. Cardiovascular: Negative. Negative for chest pain, palpitations and leg swelling. Gastrointestinal: Negative for abdominal pain, diarrhea, nausea and vomiting. Genitourinary: Negative for difficulty urinating and hematuria. Musculoskeletal: Negative for arthralgias, joint swelling and myalgias. Skin: Negative for rash. Allergic/Immunologic: Negative for environmental allergies. Neurological: Negative for dizziness, tremors, weakness and headaches. Psychiatric/Behavioral: Negative for behavioral problems and sleep disturbance. Prior to Visit Medications    Medication Sig Taking?  Authorizing Provider   Respiratory Therapy Supplies PANDA New CPAP mask and supplies  Patsy Laird MD   fexofenadine (ALLEGRA) 180 MG tablet Take 180 mg by mouth  Historical Provider, MD   VITAMIN E PO Take by mouth  Historical Provider, MD   MAGNESIUM PO Take by mouth  Historical Provider, MD   CPAP Machine MISC by Does not apply route New CPAP at 9 cm      Dx RIKKI. Patsy Laird MD   mometasone (ELOCON) 0.1 % cream   Historical Provider, MD   hyoscyamine (LEVSIN/SL) 125 MCG sublingual tablet   Historical Provider, MD   allopurinol (ZYLOPRIM) 100 MG tablet   Historical Provider, MD       Social History     Tobacco Use    Smoking status: Never Smoker    Smokeless tobacco: Never Used   Substance Use Topics    Alcohol use: Yes     Alcohol/week: 7.0 standard drinks     Types: 7 Glasses of wine per week    Drug use: Not on file        Allergies   Allergen Reactions    Bee Venom Anaphylaxis    Monosodium Glutamate Anaphylaxis    Sulfate Shortness Of Breath    Sulfamethoxazole        PHYSICAL EXAMINATION:  [ INSTRUCTIONS:  \"[x]\" Indicates a positive item  \"[]\" Indicates a negative item  -- DELETE ALL ITEMS NOT EXAMINED]  Vital Signs: (As obtained by patient/caregiver or practitioner observation)    Blood pressure-  Heart rate-    Respiratory rate-    Temperature-  Pulse oximetry-     Constitutional: [x] Appears well-developed and well-nourished [x] No apparent distress      [] Abnormal-   Mental status  [x] Alert and awake  [x] Oriented to person/place/time []Able to follow commands      Eyes:  EOM    [x]  Normal  [] Abnormal-  Sclera  []  Normal  [] Abnormal -         Discharge []  None visible  [] Abnormal -    HENT:   [x] Normocephalic, atraumatic.   [] Abnormal   [] Mouth/Throat: Mucous membranes are moist.     External Ears [] Normal  [] Abnormal-     Neck: [x] No visualized mass     Pulmonary/Chest: [x] Respiratory effort normal.  [x] No visualized signs of difficulty breathing or respiratory distress        [] Abnormal-      Musculoskeletal:   [] Normal gait with no signs of ataxia         [] Normal range of motion of neck        [] Abnormal-       Neurological:        [x] No Facial Asymmetry (Cranial nerve 7 motor function) (limited exam to video visit)          [] No gaze palsy        [] Abnormal-         Skin:        [x] No significant exanthematous lesions or discoloration noted on facial skin         [] Abnormal-            Psychiatric:       [x] Normal Affect [x] No Hallucinations        [] Abnormal-     Other pertinent observable physical exam findings-     ASSESSMENT/PLAN:  1. RIKKI on CPAP   He is using CPAP with 9 centimeters of H2O with heated humidity. He is using CPAP for about  7-8  hours every night. He is using CPAP with  Full  faceMask. He said  sleep is restful with the CPAP use. He is compliant with CPAP therapy and benefiting with CPAP useNo snoring with CPAP use. continue CPP     I reviewed compliance report with patient regarding CPAP therapy. He is using  CPAP for  days out of 30 days. Average usage of days used is 7 hours and 50 min , average AHI 2.5 with CPAP use. Counseling: CPAP/BiPAP uses, He advised to use CPAP at least 5-6 hours every night. Driving: He is advised for extreme caution when driving or operating machinery if there is a feeling of drowsiness, especially while driving it is preferable to stop driving and take a brief nap. Sleep hygiene:Avoid supine sleep, sleep on  sides. Avoid  sleep deprivation. Explained sleep hygiene. Advice to avoid Alcohol and sedative    Time spend over 30 min. Face to face. with greater than 50 % time with CPAP therapy including review compliance, counseling and advised regarding CPAP therapy. 2. Obesity (BMI 30-39. 9)  He is advised try to lose weight. obesity related risk explained to the patient ,  Suggested weight control approaches, including dietary changes , exercise, behavioral modification. No follow-ups on file.     Raza Brooks, was evaluated through a synchronous (real-time) audio-video encounter. The patient (or guardian if applicable) is aware that this is a billable service. Verbal consent to proceed has been obtained within the past 12 months. The visit was conducted pursuant to the emergency declaration under the [address] [place] [address], [address] [place] [address] and the [employer] and [employer] General Act. Patient identification was verified, and a caregiver was present when appropriate. The patient was located in a state where the provider was credentialed to provide care. Total time spent on this encounter: 31 min    --Vickey Duran MD on 12/13/2021 at 9:35 AM    An electronic signature was used to authenticate this note.

## 2022-06-16 ENCOUNTER — OFFICE VISIT (OUTPATIENT)
Dept: PULMONOLOGY | Age: 62
End: 2022-06-16
Payer: COMMERCIAL

## 2022-06-16 VITALS
SYSTOLIC BLOOD PRESSURE: 119 MMHG | HEART RATE: 78 BPM | WEIGHT: 232 LBS | TEMPERATURE: 98.2 F | HEIGHT: 72 IN | DIASTOLIC BLOOD PRESSURE: 78 MMHG | OXYGEN SATURATION: 98 % | BODY MASS INDEX: 31.42 KG/M2

## 2022-06-16 DIAGNOSIS — E66.9 OBESITY (BMI 30-39.9): ICD-10-CM

## 2022-06-16 DIAGNOSIS — G47.33 OSA ON CPAP: Primary | ICD-10-CM

## 2022-06-16 DIAGNOSIS — Z99.89 OSA ON CPAP: Primary | ICD-10-CM

## 2022-06-16 PROCEDURE — 99214 OFFICE O/P EST MOD 30 MIN: CPT | Performed by: INTERNAL MEDICINE

## 2022-06-16 ASSESSMENT — ENCOUNTER SYMPTOMS
SORE THROAT: 0
CHEST TIGHTNESS: 0
VOICE CHANGE: 0
RHINORRHEA: 0
SHORTNESS OF BREATH: 0
DIARRHEA: 0
ABDOMINAL PAIN: 0
COUGH: 0
WHEEZING: 0
NAUSEA: 0
VOMITING: 0
EYE ITCHING: 0

## 2022-06-16 NOTE — PROGRESS NOTES
Subjective:     Fan Brown is a 60 y.o. male who complains today of:     Chief Complaint   Patient presents with    Sleep Apnea     6 month f/u       HPI  He He is using CPAP with   9 centimeters of H2O with heated humidity. He is using CPAP for about  7-8  hours every night. He is using CPAP with  Full  faceMask. He said  sleep is restful with the CPAP use. He is compliant with CPAP therapy and benefiting with CPAP use. No snoring with CPAP use. No complaint of daytime sleepiness or tiredness with CPAP use. He denies taking naps. No sleepiness with driving. He denies difficulty falling asleep or staying asleep. Need new CPAP supply      I reviewed compliance report with patient regarding CPAP therapy. He is using  CPAP for  days out of 30 days. Average usage of days used is 7 hours and 50 min , average AHI 2.5 with CPAP use    He has covid 19 may 2022, he said he was not sick . He took all covid shot    Allergies:  Bee venom, Monosodium glutamate, Sulfate, and Sulfamethoxazole  No past medical history on file. No past surgical history on file. No family history on file. Social History     Socioeconomic History    Marital status:      Spouse name: Not on file    Number of children: Not on file    Years of education: Not on file    Highest education level: Not on file   Occupational History    Not on file   Tobacco Use    Smoking status: Never Smoker    Smokeless tobacco: Never Used   Substance and Sexual Activity    Alcohol use:  Yes     Alcohol/week: 7.0 standard drinks     Types: 7 Glasses of wine per week    Drug use: Not on file    Sexual activity: Not on file   Other Topics Concern    Not on file   Social History Narrative    Not on file     Social Determinants of Health     Financial Resource Strain:     Difficulty of Paying Living Expenses: Not on file   Food Insecurity:     Worried About Running Out of Food in the Last Year: Not on file    [address] in the Last Year: Not on file   Transportation Needs:     Lack of Transportation (Medical): Not on file    Lack of Transportation (Non-Medical): Not on file   Physical Activity:     Days of Exercise per Week: Not on file    Minutes of Exercise per Session: Not on file   Stress:     Feeling of Stress : Not on file   Social Connections:     Frequency of Communication with Friends and Family: Not on file    Frequency of Social Gatherings with Friends and Family: Not on file    Attends Yarsani Services: Not on file    Active Member of [address] [place] [address] Responsys or Organizations: Not on file    Attends Club or Organization Meetings: Not on file    Marital Status: Not on file   Intimate Partner Violence:     Fear of Current or Ex-Partner: Not on file    Emotionally Abused: Not on file    Physically Abused: Not on file    Sexually Abused: Not on file   Housing Stability:     Unable to Pay for Housing in the Last Year: Not on file    Number of [place] in the Last Year: Not on file    Unstable Housing in the Last Year: Not on file         Review of Systems   Constitutional: Negative for chills, diaphoresis, fatigue and fever. HENT: Negative for congestion, mouth sores, nosebleeds, postnasal drip, rhinorrhea, sneezing, sore throat and voice change. Eyes: Negative for itching and visual disturbance. Respiratory: Negative for cough, chest tightness, shortness of breath and wheezing. Cardiovascular: Negative. Negative for chest pain, palpitations and leg swelling. Gastrointestinal: Negative for abdominal pain, diarrhea, nausea and vomiting. Genitourinary: Negative for difficulty urinating and hematuria. Musculoskeletal: Negative for arthralgias, joint swelling and myalgias. Skin: Negative for rash. Allergic/Immunologic: Negative for environmental allergies. Neurological: Negative for dizziness, tremors, weakness and headaches. Psychiatric/Behavioral: Positive for sleep disturbance.  Negative for behavioral problems. :     Vitals:    06/16/22 0924   BP: 119/78   Pulse: 78   Temp: 98.2 °F (36.8 °C)   SpO2: 98%   Weight: 232 lb (105.2 kg)   Height: 6' (1.829 m)     Wt Readings from Last 3 Encounters:   06/16/22 232 lb (105.2 kg)   07/28/21 232 lb (105.2 kg)   04/10/21 225 lb (102.1 kg)         Physical Exam  Constitutional:       Appearance: He is well-developed. He is obese. HENT:      Head: Normocephalic and atraumatic. Nose: Nose normal.   Eyes:      Conjunctiva/sclera: Conjunctivae normal.      Pupils: Pupils are equal, round, and reactive to light. Neck:      Thyroid: No thyromegaly. Vascular: No JVD. Trachea: No tracheal deviation. Cardiovascular:      Rate and Rhythm: Normal rate and regular rhythm. Heart sounds: No murmur heard. No friction rub. No gallop. Pulmonary:      Effort: Pulmonary effort is normal. No respiratory distress. Breath sounds: Normal breath sounds. No wheezing or rales. Chest:      Chest wall: No tenderness. Abdominal:      General: There is no distension. Musculoskeletal:         General: Normal range of motion. Lymphadenopathy:      Cervical: No cervical adenopathy. Skin:     General: Skin is warm and dry. Findings: No rash. Neurological:      Mental Status: He is alert and oriented to person, place, and time. Cranial Nerves: No cranial nerve deficit.    Psychiatric:         Behavior: Behavior normal.         Current Outpatient Medications   Medication Sig Dispense Refill    Respiratory Therapy Supplies PANDA New CPAP mask and supplies 1 each 0    Respiratory Therapy Supplies PANDA New CPAP mask and supplies 1 each 0    fexofenadine (ALLEGRA) 180 MG tablet Take 180 mg by mouth      VITAMIN E PO Take by mouth      MAGNESIUM PO Take by mouth      CPAP Machine MISC by Does not apply route New CPAP at 9 cm      Dx RIKKI. 1 each 0    mometasone (ELOCON) 0.1 % cream       hyoscyamine (LEVSIN/SL) 125 MCG sublingual tablet       allopurinol (ZYLOPRIM) 100 MG tablet        No current facility-administered medications for this visit. Assessment/Plan:     1. RIKKI on CPAP  He He is using CPAP with   9 centimeters of H2O with heated humidity. He is using CPAP for about  7-8  hours every night. He is using CPAP with  Full  faceMask. He said  sleep is restful with the CPAP use. He is compliant with CPAP therapy and benefiting with CPAP use. No snoring with CPAP use. Need new CPAP supply . Continue CPAP as before.      I reviewed compliance report with patient regarding CPAP therapy. He is using  CPAP for  days out of 30 days. Average usage of days used is 7 hours and 50 min , average AHI 2.5 with CPAP use- Respiratory Therapy Supplies PANDA; New CPAP mask and supplies  Dispense: 1 each; Refill: 0    Counseling: CPAP/BiPAP uses, He advised to use CPAP at least 5-6 hours every night. Driving: He is advised for extreme caution when driving or operating machinery if there is a feeling of drowsiness, especially while driving it is preferable to stop driving and take a brief nap. Sleep hygiene:Avoid supine sleep, sleep on  sides. Avoid  sleep deprivation. Explained sleep hygiene. Advice to avoid Alcohol and sedative    Time spend over 30 min. Face to face. with greater than 50 % time with CPAP therapy including review compliance, counseling and advised regarding CPAP therapy. 2. Obesity (BMI 30-39. 9)  He is advised try to lose weight. obesity related risk explained to the patient ,  Current weight:  232 lb (105.2 kg) Lbs. BMI:  Body mass index is 31.46 kg/m². Suggested weight control approaches, including dietary changes , exercise, behavioral modification. Return in about 6 months (around 12/16/2022) for rikki.       Vandana Calhoun MD

## 2022-09-11 ENCOUNTER — OFFICE VISIT (OUTPATIENT)
Dept: INTERNAL MEDICINE | Age: 62
End: 2022-09-11
Payer: COMMERCIAL

## 2022-09-11 VITALS
BODY MASS INDEX: 32.28 KG/M2 | HEART RATE: 89 BPM | SYSTOLIC BLOOD PRESSURE: 128 MMHG | OXYGEN SATURATION: 96 % | TEMPERATURE: 98.2 F | WEIGHT: 238 LBS | DIASTOLIC BLOOD PRESSURE: 60 MMHG

## 2022-09-11 DIAGNOSIS — L30.9 DERMATITIS: Primary | ICD-10-CM

## 2022-09-11 PROCEDURE — 96372 THER/PROPH/DIAG INJ SC/IM: CPT | Performed by: NURSE PRACTITIONER

## 2022-09-11 PROCEDURE — 99213 OFFICE O/P EST LOW 20 MIN: CPT | Performed by: NURSE PRACTITIONER

## 2022-09-11 RX ORDER — MELOXICAM 15 MG/1
15 TABLET ORAL DAILY
COMMUNITY

## 2022-09-11 RX ORDER — TRIAMCINOLONE ACETONIDE 40 MG/ML
80 INJECTION, SUSPENSION INTRA-ARTICULAR; INTRAMUSCULAR ONCE
Status: COMPLETED | OUTPATIENT
Start: 2022-09-11 | End: 2022-09-11

## 2022-09-11 RX ADMIN — TRIAMCINOLONE ACETONIDE 80 MG: 40 INJECTION, SUSPENSION INTRA-ARTICULAR; INTRAMUSCULAR at 11:16

## 2022-09-11 SDOH — ECONOMIC STABILITY: FOOD INSECURITY: WITHIN THE PAST 12 MONTHS, THE FOOD YOU BOUGHT JUST DIDN'T LAST AND YOU DIDN'T HAVE MONEY TO GET MORE.: NEVER TRUE

## 2022-09-11 SDOH — ECONOMIC STABILITY: FOOD INSECURITY: WITHIN THE PAST 12 MONTHS, YOU WORRIED THAT YOUR FOOD WOULD RUN OUT BEFORE YOU GOT MONEY TO BUY MORE.: NEVER TRUE

## 2022-09-11 ASSESSMENT — ENCOUNTER SYMPTOMS
SHORTNESS OF BREATH: 0
CHEST TIGHTNESS: 0
TROUBLE SWALLOWING: 0
COUGH: 0
SORE THROAT: 0
WHEEZING: 0
FACIAL SWELLING: 0

## 2022-09-11 ASSESSMENT — SOCIAL DETERMINANTS OF HEALTH (SDOH): HOW HARD IS IT FOR YOU TO PAY FOR THE VERY BASICS LIKE FOOD, HOUSING, MEDICAL CARE, AND HEATING?: NOT HARD AT ALL

## 2022-09-11 NOTE — PROGRESS NOTES
Raza Brooks (:  1960) is a 60 y.o. male, Established patient, here for evaluation of the following chief complaint(s): Other (Rash on belly and legs since wed)      Vitals:    22 1011   BP: 128/60   Pulse: 89   Temp: 98.2 °F (36.8 °C)   SpO2: 96%       ASSESSMENT/PLAN:  1. Dermatitis  -     triamcinolone acetonide (KENALOG-40) injection 80 mg; 80 mg, IntraMUSCular, ONCE, 1 dose, On Sun 22 at 1045        -      may use OTC antihistamine for itching    Return if symptoms worsen or fail to improve. SUBJECTIVE/OBJECTIVE:    Other  This is a new problem. Episode onset: x4 days, rash on waist and groin, itchy. pt has hx of contact dermatitis and takes Allegra daily. put chemicals to close pool and had to get in water after doing so to center the tube but also pruned some trees. The problem occurs constantly. The problem has been gradually worsening. Associated symptoms include a rash. Pertinent negatives include no arthralgias, chest pain, chills, congestion, coughing, fatigue, fever, myalgias, neck pain or sore throat. Treatments tried: steroid cream, nystatin, benadryl. Review of Systems   Constitutional:  Negative for chills, fatigue and fever. HENT:  Negative for congestion, facial swelling, sore throat and trouble swallowing. Respiratory:  Negative for cough, chest tightness, shortness of breath and wheezing. Cardiovascular:  Negative for chest pain and palpitations. Musculoskeletal:  Negative for arthralgias, myalgias and neck pain. Skin:  Positive for rash. Negative for pallor and wound. Physical Exam  Vitals reviewed. Constitutional:       General: He is not in acute distress. Appearance: Normal appearance. HENT:      Mouth/Throat:      Lips: Pink. Mouth: Mucous membranes are moist.      Pharynx: Oropharynx is clear. No pharyngeal swelling or posterior oropharyngeal erythema. Cardiovascular:      Rate and Rhythm: Normal rate and regular rhythm.

## 2022-12-19 ENCOUNTER — TELEMEDICINE (OUTPATIENT)
Dept: PULMONOLOGY | Age: 62
End: 2022-12-19
Payer: COMMERCIAL

## 2022-12-19 DIAGNOSIS — E66.9 OBESITY (BMI 30-39.9): ICD-10-CM

## 2022-12-19 DIAGNOSIS — Z99.89 OSA ON CPAP: Primary | ICD-10-CM

## 2022-12-19 DIAGNOSIS — G47.33 OSA ON CPAP: Primary | ICD-10-CM

## 2022-12-19 PROCEDURE — 99443 PR PHYS/QHP TELEPHONE EVALUATION 21-30 MIN: CPT | Performed by: INTERNAL MEDICINE

## 2022-12-19 RX ORDER — NYSTATIN 100000 [USP'U]/G
POWDER TOPICAL
COMMUNITY
Start: 2022-12-06

## 2022-12-19 RX ORDER — HYDROXYCHLOROQUINE SULFATE 200 MG/1
TABLET, FILM COATED ORAL
COMMUNITY
Start: 2022-12-15

## 2022-12-19 ASSESSMENT — ENCOUNTER SYMPTOMS
VOICE CHANGE: 0
COUGH: 0
SHORTNESS OF BREATH: 0
RHINORRHEA: 0
EYE ITCHING: 0
NAUSEA: 0
WHEEZING: 0
SORE THROAT: 0
DIARRHEA: 0
CHEST TIGHTNESS: 0
VOMITING: 0
ABDOMINAL PAIN: 0

## 2022-12-19 NOTE — PROGRESS NOTES
Krzysztof Mendoza (:  1960) is a Established patient, here for evaluation of the following:    Assessment & Plan   Below is the assessment and plan developed based on review of pertinent history, physical exam, labs, studies, and medications. 1. RIKKI on CPAP  2. Obesity (BMI 30-39. 9)    He is using CPAP with 9 cm of H2O for about 7 to 8 hours every night with full facemask and sleep well with the CPAP therapy. He said he does not snore when you use a CPAP and he does not take naps and he does not have any sleepiness while driving. He is trying to lose weight but not much success he said he weighed about 235 pounds. He needs CPAP supply order. I reviewed compliance report he uses 100% of the time average use is 7 hours 53 minutes and AHI is 1.8. I will follow him in 6-month earlier if needed. Return in about 6 months (around 2023) for rikki. Subjective   HPI  He He is using CPAP with   9 centimeters of H2O with heated humidity. He is using CPAP for about  6-8 hours every night. He is using CPAP with  Full face Mask. He said  sleep is restful with the CPAP use. He is compliant with CPAP therapy and benefiting with CPAP use. No snoring with CPAP use. No complaint of daytime sleepiness or tiredness with CPAP use. He denies taking naps. No sleepiness with driving. He denies difficulty falling asleep or staying asleep. Need new CPAP supply , need to send order      I reviewed compliance report with patient regarding CPAP therapy. He is using  CPAP for  days out of 30 days. Average usage of days used is 7 hours and 53 min , average AHI 1.8 with CPAP use    Review of Systems   Constitutional:  Negative for chills, diaphoresis, fatigue and fever. HENT:  Negative for congestion, mouth sores, nosebleeds, postnasal drip, rhinorrhea, sneezing, sore throat and voice change. Eyes:  Negative for itching and visual disturbance.    Respiratory:  Negative for cough, chest tightness, shortness of breath and wheezing. Cardiovascular: Negative. Negative for chest pain, palpitations and leg swelling. Gastrointestinal:  Negative for abdominal pain, diarrhea, nausea and vomiting. Genitourinary:  Negative for difficulty urinating and hematuria. Musculoskeletal:  Negative for arthralgias, joint swelling and myalgias. Skin:  Negative for rash. Allergic/Immunologic: Negative for environmental allergies. Neurological:  Negative for dizziness, tremors, weakness and headaches. Psychiatric/Behavioral:  Positive for sleep disturbance. Negative for behavioral problems. Objective   Patient-Reported Vitals  No data recorded     Physical Exam phone visit          On this date 12/19/2022 I have spent 22 minutes reviewing previous notes, test results and face to face (virtual) with the patient discussing the diagnosis and importance of compliance with the treatment plan as well as documenting on the day of the visit. Trace Palacio was evaluated through a synchronous (real-time) audio-video encounter. The patient (or guardian if applicable) is aware that this is a billable service, which includes applicable co-pays. This Virtual Visit was conducted with patient's (and/or legal guardian's) consent. The visit was conducted pursuant to the emergency declaration under the [address] [place] [address], [address] [place] [address] authority and the [employer] General Act. Patient identification was verified, and a caregiver was present when appropriate. The patient was located at Home: [address] [place] [address]  [address] [address]. Provider was located at [place] [address]): [address].         --Kristopher Young MD

## 2023-04-04 LAB
ALANINE AMINOTRANSFERASE (SGPT) (U/L) IN SER/PLAS: 16 U/L (ref 10–52)
ALBUMIN (G/DL) IN SER/PLAS: 4.1 G/DL (ref 3.4–5)
ALKALINE PHOSPHATASE (U/L) IN SER/PLAS: 51 U/L (ref 33–136)
ANION GAP IN SER/PLAS: 11 MMOL/L (ref 10–20)
ASPARTATE AMINOTRANSFERASE (SGOT) (U/L) IN SER/PLAS: 18 U/L (ref 9–39)
BASOPHILS (10*3/UL) IN BLOOD BY AUTOMATED COUNT: 0.05 X10E9/L (ref 0–0.1)
BASOPHILS/100 LEUKOCYTES IN BLOOD BY AUTOMATED COUNT: 0.7 % (ref 0–2)
BILIRUBIN TOTAL (MG/DL) IN SER/PLAS: 1 MG/DL (ref 0–1.2)
CALCIUM (MG/DL) IN SER/PLAS: 9.2 MG/DL (ref 8.6–10.3)
CARBON DIOXIDE, TOTAL (MMOL/L) IN SER/PLAS: 27 MMOL/L (ref 21–32)
CHLORIDE (MMOL/L) IN SER/PLAS: 107 MMOL/L (ref 98–107)
CHOLESTEROL (MG/DL) IN SER/PLAS: 161 MG/DL (ref 0–199)
CHOLESTEROL IN HDL (MG/DL) IN SER/PLAS: 55 MG/DL
CHOLESTEROL/HDL RATIO: 2.9
CREATININE (MG/DL) IN SER/PLAS: 1.15 MG/DL (ref 0.5–1.3)
EOSINOPHILS (10*3/UL) IN BLOOD BY AUTOMATED COUNT: 0.31 X10E9/L (ref 0–0.7)
EOSINOPHILS/100 LEUKOCYTES IN BLOOD BY AUTOMATED COUNT: 4.1 % (ref 0–6)
ERYTHROCYTE DISTRIBUTION WIDTH (RATIO) BY AUTOMATED COUNT: 13.2 % (ref 11.5–14.5)
ERYTHROCYTE MEAN CORPUSCULAR HEMOGLOBIN CONCENTRATION (G/DL) BY AUTOMATED: 33.3 G/DL (ref 32–36)
ERYTHROCYTE MEAN CORPUSCULAR VOLUME (FL) BY AUTOMATED COUNT: 94 FL (ref 80–100)
ERYTHROCYTES (10*6/UL) IN BLOOD BY AUTOMATED COUNT: 4.56 X10E12/L (ref 4.5–5.9)
GFR MALE: 71 ML/MIN/1.73M2
GLUCOSE (MG/DL) IN SER/PLAS: 98 MG/DL (ref 74–99)
HEMATOCRIT (%) IN BLOOD BY AUTOMATED COUNT: 43 % (ref 41–52)
HEMOGLOBIN (G/DL) IN BLOOD: 14.3 G/DL (ref 13.5–17.5)
IMMATURE GRANULOCYTES/100 LEUKOCYTES IN BLOOD BY AUTOMATED COUNT: 0.3 % (ref 0–0.9)
LDL: 90 MG/DL (ref 0–99)
LEUKOCYTES (10*3/UL) IN BLOOD BY AUTOMATED COUNT: 7.6 X10E9/L (ref 4.4–11.3)
LYMPHOCYTES (10*3/UL) IN BLOOD BY AUTOMATED COUNT: 2.28 X10E9/L (ref 1.2–4.8)
LYMPHOCYTES/100 LEUKOCYTES IN BLOOD BY AUTOMATED COUNT: 30 % (ref 13–44)
MAGNESIUM (MG/DL) IN SER/PLAS: 1.95 MG/DL (ref 1.6–2.4)
MONOCYTES (10*3/UL) IN BLOOD BY AUTOMATED COUNT: 0.58 X10E9/L (ref 0.1–1)
MONOCYTES/100 LEUKOCYTES IN BLOOD BY AUTOMATED COUNT: 7.6 % (ref 2–10)
NEUTROPHILS (10*3/UL) IN BLOOD BY AUTOMATED COUNT: 4.35 X10E9/L (ref 1.2–7.7)
NEUTROPHILS/100 LEUKOCYTES IN BLOOD BY AUTOMATED COUNT: 57.3 % (ref 40–80)
PLATELETS (10*3/UL) IN BLOOD AUTOMATED COUNT: 152 X10E9/L (ref 150–450)
POTASSIUM (MMOL/L) IN SER/PLAS: 3.9 MMOL/L (ref 3.5–5.3)
PROTEIN TOTAL: 6.8 G/DL (ref 6.4–8.2)
SODIUM (MMOL/L) IN SER/PLAS: 141 MMOL/L (ref 136–145)
TRIGLYCERIDE (MG/DL) IN SER/PLAS: 82 MG/DL (ref 0–149)
URATE (MG/DL) IN SER/PLAS: 5.9 MG/DL (ref 4–7.5)
UREA NITROGEN (MG/DL) IN SER/PLAS: 20 MG/DL (ref 6–23)
VLDL: 16 MG/DL (ref 0–40)

## 2023-08-02 ENCOUNTER — OFFICE VISIT (OUTPATIENT)
Dept: PULMONOLOGY | Age: 63
End: 2023-08-02
Payer: COMMERCIAL

## 2023-08-02 VITALS
WEIGHT: 235 LBS | BODY MASS INDEX: 31.87 KG/M2 | SYSTOLIC BLOOD PRESSURE: 114 MMHG | HEART RATE: 68 BPM | OXYGEN SATURATION: 97 % | DIASTOLIC BLOOD PRESSURE: 88 MMHG

## 2023-08-02 DIAGNOSIS — G47.33 OSA ON CPAP: Primary | ICD-10-CM

## 2023-08-02 DIAGNOSIS — Z99.89 OSA ON CPAP: Primary | ICD-10-CM

## 2023-08-02 DIAGNOSIS — E66.9 OBESITY (BMI 30-39.9): ICD-10-CM

## 2023-08-02 PROCEDURE — 99214 OFFICE O/P EST MOD 30 MIN: CPT | Performed by: INTERNAL MEDICINE

## 2023-08-02 RX ORDER — FAMOTIDINE 20 MG/1
TABLET, FILM COATED ORAL
COMMUNITY

## 2023-08-02 RX ORDER — CELECOXIB 200 MG/1
CAPSULE ORAL
COMMUNITY
Start: 2023-06-20

## 2023-08-02 ASSESSMENT — ENCOUNTER SYMPTOMS
VOMITING: 0
WHEEZING: 0
VOICE CHANGE: 0
SHORTNESS OF BREATH: 0
RHINORRHEA: 0
EYE ITCHING: 0
NAUSEA: 0
COUGH: 0
SORE THROAT: 0
CHEST TIGHTNESS: 0
ABDOMINAL PAIN: 0
DIARRHEA: 0

## 2023-08-02 NOTE — PROGRESS NOTES
Subjective:     Malathi Bender is a 58 y.o. male who complains today of:     Chief Complaint   Patient presents with    Follow-up     6m f/u on RIKKI       HPI  He is using CPAP with  9  centimeters of H2O with heated humidity. He is using CPAP for about  8-9 hours every night. He is using CPAP with  Full face Mask.he said he is doing better with less humidity . He said  sleep is restful with the CPAP use. He is compliant with CPAP therapy and benefiting with CPAP use. No snoring with CPAP use. No complaint of daytime sleepiness or tiredness with CPAP use. He denies taking naps. No sleepiness with driving. He denies difficulty falling asleep or staying asleep. I reviewed compliance report with patient regarding CPAP therapy. He is using  CPAP for 28 days out of 30 days. Average usage of days used is 8 hours and 30 min , average AHI 2.7 with CPAP use. Allergies:  Bee venom, Monosodium glutamate, Sulfate, and Sulfamethoxazole  No past medical history on file. No past surgical history on file. No family history on file. Social History     Socioeconomic History    Marital status:      Spouse name: Not on file    Number of children: Not on file    Years of education: Not on file    Highest education level: Not on file   Occupational History    Not on file   Tobacco Use    Smoking status: Never    Smokeless tobacco: Never   Substance and Sexual Activity    Alcohol use:  Yes     Alcohol/week: 7.0 standard drinks     Types: 7 Glasses of wine per week    Drug use: Not on file    Sexual activity: Not on file   Other Topics Concern    Not on file   Social History Narrative    Not on file     Social Determinants of Health     Financial Resource Strain: Low Risk     Difficulty of Paying Living Expenses: Not hard at all   Food Insecurity: No Food Insecurity    Worried About Running Out of Food in the Last Year: Never true    Ran Out of Food in the Last Year: Never true   Transportation Needs: Not on

## 2023-09-01 LAB
ALANINE AMINOTRANSFERASE (SGPT) (U/L) IN SER/PLAS: 19 U/L (ref 10–52)
ALBUMIN (G/DL) IN SER/PLAS: 4.3 G/DL (ref 3.4–5)
ALKALINE PHOSPHATASE (U/L) IN SER/PLAS: 53 U/L (ref 33–136)
ANION GAP IN SER/PLAS: 10 MMOL/L (ref 10–20)
ASPARTATE AMINOTRANSFERASE (SGOT) (U/L) IN SER/PLAS: 20 U/L (ref 9–39)
BASOPHILS (10*3/UL) IN BLOOD BY AUTOMATED COUNT: 0.03 X10E9/L (ref 0–0.1)
BASOPHILS/100 LEUKOCYTES IN BLOOD BY AUTOMATED COUNT: 0.5 % (ref 0–2)
BILIRUBIN TOTAL (MG/DL) IN SER/PLAS: 0.8 MG/DL (ref 0–1.2)
CALCIUM (MG/DL) IN SER/PLAS: 9.1 MG/DL (ref 8.6–10.3)
CARBON DIOXIDE, TOTAL (MMOL/L) IN SER/PLAS: 26 MMOL/L (ref 21–32)
CHLORIDE (MMOL/L) IN SER/PLAS: 106 MMOL/L (ref 98–107)
CHOLESTEROL (MG/DL) IN SER/PLAS: 176 MG/DL (ref 0–199)
CHOLESTEROL IN HDL (MG/DL) IN SER/PLAS: 59.7 MG/DL
CHOLESTEROL/HDL RATIO: 2.9
CREATININE (MG/DL) IN SER/PLAS: 1.01 MG/DL (ref 0.5–1.3)
EOSINOPHILS (10*3/UL) IN BLOOD BY AUTOMATED COUNT: 0.37 X10E9/L (ref 0–0.7)
EOSINOPHILS/100 LEUKOCYTES IN BLOOD BY AUTOMATED COUNT: 5.9 % (ref 0–6)
ERYTHROCYTE DISTRIBUTION WIDTH (RATIO) BY AUTOMATED COUNT: 13 % (ref 11.5–14.5)
ERYTHROCYTE MEAN CORPUSCULAR HEMOGLOBIN CONCENTRATION (G/DL) BY AUTOMATED: 32.9 G/DL (ref 32–36)
ERYTHROCYTE MEAN CORPUSCULAR VOLUME (FL) BY AUTOMATED COUNT: 95 FL (ref 80–100)
ERYTHROCYTES (10*6/UL) IN BLOOD BY AUTOMATED COUNT: 4.52 X10E12/L (ref 4.5–5.9)
GFR MALE: 83 ML/MIN/1.73M2
GLUCOSE (MG/DL) IN SER/PLAS: 101 MG/DL (ref 74–99)
HEMATOCRIT (%) IN BLOOD BY AUTOMATED COUNT: 42.9 % (ref 41–52)
HEMOGLOBIN (G/DL) IN BLOOD: 14.1 G/DL (ref 13.5–17.5)
IMMATURE GRANULOCYTES/100 LEUKOCYTES IN BLOOD BY AUTOMATED COUNT: 0.3 % (ref 0–0.9)
LDL: 100 MG/DL (ref 0–99)
LEUKOCYTES (10*3/UL) IN BLOOD BY AUTOMATED COUNT: 6.3 X10E9/L (ref 4.4–11.3)
LYMPHOCYTES (10*3/UL) IN BLOOD BY AUTOMATED COUNT: 2.31 X10E9/L (ref 1.2–4.8)
LYMPHOCYTES/100 LEUKOCYTES IN BLOOD BY AUTOMATED COUNT: 36.8 % (ref 13–44)
MAGNESIUM (MG/DL) IN SER/PLAS: 1.9 MG/DL (ref 1.6–2.4)
MONOCYTES (10*3/UL) IN BLOOD BY AUTOMATED COUNT: 0.5 X10E9/L (ref 0.1–1)
MONOCYTES/100 LEUKOCYTES IN BLOOD BY AUTOMATED COUNT: 8 % (ref 2–10)
NEUTROPHILS (10*3/UL) IN BLOOD BY AUTOMATED COUNT: 3.05 X10E9/L (ref 1.2–7.7)
NEUTROPHILS/100 LEUKOCYTES IN BLOOD BY AUTOMATED COUNT: 48.5 % (ref 40–80)
PLATELETS (10*3/UL) IN BLOOD AUTOMATED COUNT: 166 X10E9/L (ref 150–450)
POTASSIUM (MMOL/L) IN SER/PLAS: 4 MMOL/L (ref 3.5–5.3)
PROSTATE SPECIFIC ANTIGEN,SCREEN: 1.06 NG/ML (ref 0–4)
PROTEIN TOTAL: 6.7 G/DL (ref 6.4–8.2)
SODIUM (MMOL/L) IN SER/PLAS: 138 MMOL/L (ref 136–145)
TRIGLYCERIDE (MG/DL) IN SER/PLAS: 82 MG/DL (ref 0–149)
UREA NITROGEN (MG/DL) IN SER/PLAS: 21 MG/DL (ref 6–23)
VLDL: 16 MG/DL (ref 0–40)

## 2023-10-13 ENCOUNTER — APPOINTMENT (OUTPATIENT)
Dept: ORTHOPEDIC SURGERY | Facility: CLINIC | Age: 63
End: 2023-10-13
Payer: COMMERCIAL

## 2023-10-21 PROBLEM — R55 NEAR SYNCOPE: Status: ACTIVE | Noted: 2023-10-21

## 2023-10-21 PROBLEM — R00.2 PALPITATIONS: Status: ACTIVE | Noted: 2023-10-21

## 2023-10-21 PROBLEM — E66.9 OBESE: Status: ACTIVE | Noted: 2023-10-21

## 2023-10-21 PROBLEM — K21.9 GERD (GASTROESOPHAGEAL REFLUX DISEASE): Status: ACTIVE | Noted: 2023-10-21

## 2023-10-21 PROBLEM — I49.3 PVC'S (PREMATURE VENTRICULAR CONTRACTIONS): Status: ACTIVE | Noted: 2023-10-21

## 2023-10-21 PROBLEM — M17.9 KNEE OSTEOARTHRITIS: Status: ACTIVE | Noted: 2023-10-21

## 2023-10-21 RX ORDER — GLUCOSAMINE/CHONDR SU A SOD 750-600 MG
TABLET ORAL
COMMUNITY
End: 2023-11-01 | Stop reason: WASHOUT

## 2023-10-21 RX ORDER — HYOSCYAMINE SULFATE 0.12 MG/1
0.12 TABLET, ORALLY DISINTEGRATING ORAL DAILY
COMMUNITY
Start: 2020-11-06 | End: 2024-03-15 | Stop reason: SDUPTHER

## 2023-10-21 RX ORDER — ACETAMINOPHEN 500 MG
TABLET ORAL
COMMUNITY
End: 2023-11-01 | Stop reason: WASHOUT

## 2023-10-21 RX ORDER — MELOXICAM 15 MG/1
1 TABLET ORAL DAILY
COMMUNITY
Start: 2022-08-24 | End: 2024-02-01 | Stop reason: WASHOUT

## 2023-10-21 RX ORDER — LUTEIN 6 MG
TABLET ORAL
COMMUNITY

## 2023-10-21 RX ORDER — HYDROCORTISONE ACETATE 0.5 %
CREAM (GRAM) TOPICAL
COMMUNITY

## 2023-10-21 RX ORDER — ALLOPURINOL 100 MG/1
1 TABLET ORAL DAILY
COMMUNITY
Start: 2021-06-01

## 2023-10-21 RX ORDER — PANTOPRAZOLE SODIUM 40 MG/1
1 TABLET, DELAYED RELEASE ORAL DAILY
Status: ON HOLD | COMMUNITY
End: 2024-02-12 | Stop reason: ALTCHOICE

## 2023-10-24 ENCOUNTER — ANCILLARY PROCEDURE (OUTPATIENT)
Dept: RADIOLOGY | Facility: CLINIC | Age: 63
End: 2023-10-24
Payer: COMMERCIAL

## 2023-10-24 ENCOUNTER — OFFICE VISIT (OUTPATIENT)
Dept: ORTHOPEDIC SURGERY | Facility: CLINIC | Age: 63
End: 2023-10-24
Payer: COMMERCIAL

## 2023-10-24 DIAGNOSIS — M17.12 PRIMARY OSTEOARTHRITIS OF LEFT KNEE: ICD-10-CM

## 2023-10-24 DIAGNOSIS — M25.562 LEFT KNEE PAIN, UNSPECIFIED CHRONICITY: Primary | ICD-10-CM

## 2023-10-24 DIAGNOSIS — M25.562 LEFT KNEE PAIN, UNSPECIFIED CHRONICITY: ICD-10-CM

## 2023-10-24 PROCEDURE — 73564 X-RAY EXAM KNEE 4 OR MORE: CPT | Mod: LT

## 2023-10-24 PROCEDURE — 73564 X-RAY EXAM KNEE 4 OR MORE: CPT | Mod: LEFT SIDE | Performed by: ORTHOPAEDIC SURGERY

## 2023-10-24 PROCEDURE — 99214 OFFICE O/P EST MOD 30 MIN: CPT | Performed by: ORTHOPAEDIC SURGERY

## 2023-10-24 NOTE — LETTER
October 25, 2023     Jensen Viera MD  [address]  [place] [address]    Patient: Vinicius Gallagher   YOB: 1960   Date of Visit: 10/24/2023       Dear Dr. Jensen Viera MD:    Thank you for referring Vinicius Gallagher to me for evaluation. Below are my notes for this consultation.  If you have questions, please do not hesitate to call me. I look forward to following your patient along with you.       Sincerely,     Antonio De Luna MD      CC: No Recipients  ______________________________________________________________________________________                        History of present illness    63-year-old gentleman chronic left-sided knee pain knows he has significant knee arthritis he had previous viscosupplementation with relief he is having increasing pain along the medial side of his knee difficulty bearing weight no new injury he is thinking about knee replacement sometime in February he had good result from previous viscosupplementation he would like to try viscosupplementation 1 more time in his left knee if possible.      Past medical , Surgical, Family and social history reviewed.      Physical exam  General: No acute distress and breathing comfortably.  Patient is pleasant and cooperative with the examination.    Extremity  The affected knee was examined and inspected and was tender to touch along the medial aspect.  The patient has catching/locking and occasional mechanical symptoms.  The skin was intact without breakdown or open wounds.  There was a mild Mary Jo exam seen with mild evidence of instability and weakness in the collateral ligaments with laxity to varus valgus stress and in the anterior posterior plane.  There was a negative Lachman's test, pivot shift test, and posterior drawer sign.  There was no foot drop, numbness or tingling.  Sensation, reflexes, and pulses in the foot and ankle were present.  There was an effusion but range of motion was good and straight leg raise  testing was normal.   The patient had the ability to bear weight but with discomfort.  The patient's gait was antalgic secondary to the discomfort. Knee range of motion was 5-115    Diagnostics      XR knee left 4+ views    Result Date: 10/24/2023  Interpreted By:  Mateo De Luna, STUDY: XR KNEE LEFT 4+ VIEWS;  ;  10/24/2023 3:10 pm   INDICATION: Signs/Symptoms:pain.   ACCESSION NUMBER(S): [id number]   ORDERING CLINICIAN: MATEO DE LUNA   FINDINGS: Left knee weightbearing four views. Moderate to severe knee arthritis is varus deformity subchondral sclerosis bone-on-bone articulation medial joint space chondrocalcinosis moderate knee effusion no signs of fracture dislocation or other bony abnormality     Signed by: Mateo De Luna 10/24/2023 4:20 PM Dictation workstation:   [id number]       Procedure  [ none]    Assessment  Osteoarthritis knee left    Treatment plan  1.  The natural history of the condition and its associated treatment alternatives including surgical and nonsurgical options were discussed with the patient at length.  2.  Patient with significant impairment related to his left knee arthritis refractory to conservative treatment discussed surgical nonsurgical options were to submit for authorization for Synvisc 1 as he had good results with that in the past.  In conclusion, this patient has osteoarthritis of the knee which is symptomatic.  This is causing significant morning stiffness lasting over an hour.  The patient has popping clicking and grinding in the knee with range of motion that is decreased and gets worse with prolonged standing or going up and down stairs.  This affects functional activities and activities of daily living.  There is radiographic evidence of osteoarthritis with joint space narrowing and marginal osteophyte formation.  This patient has also failed nonpharmacologic treatment for osteoarthritis including attempts at weight loss, and a home exercise program with or  without physical therapy.  The patient has also failed pharmacologic treatments for osteoarthritis including over-the-counter analgesics, anti-inflammatory medication as well as injectable treatments.  For these reasons I feel the patient is a candidate for Visco supplementation injections of the knee.  He would also like to go ahead and schedule his total knee replacement for sometime in February next year.  3.  Patient has failed all forms of conservative treatment.  The joint pain is significantly affecting quality of life and activities of daily living.    The patient has pain in the joint that is increased with activity and weightbearing, and walks with an antalgic gait.  The pain is interfering with activities of daily living.  Patient has limited range of motion and pain with passive range of motion.  X-rays demonstrate joint space narrowing, subchondral sclerosis and osteophyte formation.  Symptoms are not improving with medication, physical therapy or supportive device for a period of at least 3 months.      Patient would like to go and schedule joint replacement surgery.  The procedure its risks, benefits, and treatment alternatives were discussed at length and patient would like to proceed.  Patient is going to schedule the procedure at their earliest convenience and see me back for a preop visit just prior.  Preadmission testing, medical checkup, and insurance authorization will be performed in the meantime.  Patient understands a joint replacement surgery is a last resort, although a very successful operation results are not guaranteed.  4.  All of the patient's questions were answered.    This note was prepared using voice recognition software.  The details of this note are correct and have been reviewed, and corrected to the best of my ability.  Some grammatical areas may persist related to the Dragon software    Antonio De Luna MD  Senior Attending Physician  [facility]  [place]    [phone]

## 2023-10-25 NOTE — PROGRESS NOTES
History of present illness    63-year-old gentleman chronic left-sided knee pain knows he has significant knee arthritis he had previous viscosupplementation with relief he is having increasing pain along the medial side of his knee difficulty bearing weight no new injury he is thinking about knee replacement sometime in February he had good result from previous viscosupplementation he would like to try viscosupplementation 1 more time in his left knee if possible.      Past medical , Surgical, Family and social history reviewed.      Physical exam  General: No acute distress and breathing comfortably.  Patient is pleasant and cooperative with the examination.    Extremity  The affected knee was examined and inspected and was tender to touch along the medial aspect.  The patient has catching/locking and occasional mechanical symptoms.  The skin was intact without breakdown or open wounds.  There was a mild Mary Jo exam seen with mild evidence of instability and weakness in the collateral ligaments with laxity to varus valgus stress and in the anterior posterior plane.  There was a negative Lachman's test, pivot shift test, and posterior drawer sign.  There was no foot drop, numbness or tingling.  Sensation, reflexes, and pulses in the foot and ankle were present.  There was an effusion but range of motion was good and straight leg raise testing was normal.   The patient had the ability to bear weight but with discomfort.  The patient's gait was antalgic secondary to the discomfort. Knee range of motion was 5-115    Diagnostics      XR knee left 4+ views    Result Date: 10/24/2023  Interpreted By:  Mateo De Luna, STUDY: XR KNEE LEFT 4+ VIEWS;  ;  10/24/2023 3:10 pm   INDICATION: Signs/Symptoms:pain.   ACCESSION NUMBER(S): [id number]   ORDERING CLINICIAN: MATEO DE LUNA   FINDINGS: Left knee weightbearing four views. Moderate to severe knee arthritis is varus deformity subchondral  sclerosis bone-on-bone articulation medial joint space chondrocalcinosis moderate knee effusion no signs of fracture dislocation or other bony abnormality     Signed by: Antonio De Luna 10/24/2023 4:20 PM Dictation workstation:   [id number]       Procedure  [ none]    Assessment  Osteoarthritis knee left    Treatment plan  1.  The natural history of the condition and its associated treatment alternatives including surgical and nonsurgical options were discussed with the patient at length.  2.  Patient with significant impairment related to his left knee arthritis refractory to conservative treatment discussed surgical nonsurgical options were to submit for authorization for Synvisc 1 as he had good results with that in the past.  In conclusion, this patient has osteoarthritis of the knee which is symptomatic.  This is causing significant morning stiffness lasting over an hour.  The patient has popping clicking and grinding in the knee with range of motion that is decreased and gets worse with prolonged standing or going up and down stairs.  This affects functional activities and activities of daily living.  There is radiographic evidence of osteoarthritis with joint space narrowing and marginal osteophyte formation.  This patient has also failed nonpharmacologic treatment for osteoarthritis including attempts at weight loss, and a home exercise program with or without physical therapy.  The patient has also failed pharmacologic treatments for osteoarthritis including over-the-counter analgesics, anti-inflammatory medication as well as injectable treatments.  For these reasons I feel the patient is a candidate for Visco supplementation injections of the knee.  He would also like to go ahead and schedule his total knee replacement for sometime in February next year.  3.  Patient has failed all forms of conservative treatment.  The joint pain is significantly affecting quality of life and activities of daily  living.    The patient has pain in the joint that is increased with activity and weightbearing, and walks with an antalgic gait.  The pain is interfering with activities of daily living.  Patient has limited range of motion and pain with passive range of motion.  X-rays demonstrate joint space narrowing, subchondral sclerosis and osteophyte formation.  Symptoms are not improving with medication, physical therapy or supportive device for a period of at least 3 months.      Patient would like to go and schedule joint replacement surgery.  The procedure its risks, benefits, and treatment alternatives were discussed at length and patient would like to proceed.  Patient is going to schedule the procedure at their earliest convenience and see me back for a preop visit just prior.  Preadmission testing, medical checkup, and insurance authorization will be performed in the meantime.  Patient understands a joint replacement surgery is a last resort, although a very successful operation results are not guaranteed.  4.  All of the patient's questions were answered.    This note was prepared using voice recognition software.  The details of this note are correct and have been reviewed, and corrected to the best of my ability.  Some grammatical areas may persist related to the Dragon software    Antonio De Luna MD  Senior Attending Physician  [facility]  [facility] [place]    [phone]

## 2023-11-01 ENCOUNTER — OFFICE VISIT (OUTPATIENT)
Dept: CARDIOLOGY | Facility: CLINIC | Age: 63
End: 2023-11-01
Payer: COMMERCIAL

## 2023-11-01 VITALS
WEIGHT: 231 LBS | SYSTOLIC BLOOD PRESSURE: 112 MMHG | DIASTOLIC BLOOD PRESSURE: 66 MMHG | BODY MASS INDEX: 31.29 KG/M2 | HEART RATE: 61 BPM | TEMPERATURE: 97.5 F | HEIGHT: 72 IN

## 2023-11-01 DIAGNOSIS — I49.3 PVC'S (PREMATURE VENTRICULAR CONTRACTIONS): Primary | ICD-10-CM

## 2023-11-01 PROCEDURE — 99213 OFFICE O/P EST LOW 20 MIN: CPT | Performed by: INTERNAL MEDICINE

## 2023-11-01 PROCEDURE — 1036F TOBACCO NON-USER: CPT | Performed by: INTERNAL MEDICINE

## 2023-11-01 RX ORDER — REGADENOSON 0.08 MG/ML
0.4 INJECTION, SOLUTION INTRAVENOUS
Qty: 5 ML | Refills: 0 | Status: SHIPPED | OUTPATIENT
Start: 2023-11-01 | End: 2024-02-01 | Stop reason: WASHOUT

## 2023-11-01 RX ORDER — CELECOXIB 50 MG/1
50 CAPSULE ORAL DAILY
COMMUNITY

## 2023-11-01 ASSESSMENT — PATIENT HEALTH QUESTIONNAIRE - PHQ9
2. FEELING DOWN, DEPRESSED OR HOPELESS: NOT AT ALL
1. LITTLE INTEREST OR PLEASURE IN DOING THINGS: NOT AT ALL
SUM OF ALL RESPONSES TO PHQ9 QUESTIONS 1 AND 2: 0

## 2023-11-01 NOTE — PROGRESS NOTES
Subjective:  The patient is a 63-year-old white male, followed primarily by Dr. Jensen Viera, who presents for follow-up because of premature ventricular complexes and remote neurally-mediated syncope.  He had negative functional cardiac study in 2014.  He is known to have a minor PVC burden, found to be 1.3% in 2018.  When he took straight magnesium supplements, he had loose stools, but is on a multivitamin with a small amount of magnesium in it.  He has not been bothered by palpitations.    The patient also has a history of classic neurally-mediated syncope.  He is prescribed Levsin 0.125 mg sublingually to take as needed but actually swallows a pill once daily.  He has been free of syncope.    The patient is in need of left knee replacement, and was requesting clearance for this.  The procedure is scheduled to be done in the next few months by Dr. De Luna.    The patient is retired, and his wife Trinidad hopes to wrap up her career with [employer] in 2024.  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    Current Outpatient Medications   Medication Sig    allopurinol (Zyloprim) 100 mg tablet Take 1 tablet (100 mg) by mouth once daily.    celecoxib (CeleBREX) 50 mg capsule Take 1 capsule (50 mg) by mouth once daily.    glucosam james dip-chondroit-C-Mn 508-924-46-3 mg capsule Take by mouth.    hyoscyamine 0.125 mg disintegrating tablet Place 1 tablet (0.125 mg) under the tongue 2 times a day.    meloxicam (Mobic) 15 mg tablet Take 1 tablet (15 mg) by mouth once daily.    multivit-min/folic acid/vit K1 (MULTI FOR HIM, NO IRON, ORAL) Take by mouth.    pantoprazole (ProtoNix) 40 mg EC tablet Take 1 tablet (40 mg) by mouth once daily.    vitamin E acid succinate (vitamin E succinate) 268 mg (400 unit) tablet Take by mouth.     Allergies:  Grass pollen, Monosodium glutamate, Sulfamethoxazole, and Tree and shrub pollen     Patient Active Problem List   Diagnosis    GERD (gastroesophageal reflux  disease)    Knee osteoarthritis    Near syncope    Obese    Palpitations    PVC's (premature ventricular contractions)     Past Surgical History:   Procedure Laterality Date    OTHER SURGICAL HISTORY  07/20/2021    Tonsillectomy    OTHER SURGICAL HISTORY  10/26/2021    Cholecystectomy laparoscopic    OTHER SURGICAL HISTORY  10/26/2021    Hernia repair    OTHER SURGICAL HISTORY  10/26/2021    Colonoscopy complete for polypectomy    OTHER SURGICAL HISTORY  10/26/2021    Surgery      Objective:  Vitals:    11/01/23 0805   BP: 112/66   Pulse: 61   Temp: 36.4 °C (97.5 °F)   Height:     1.829 m (6')  Weight: 105 kg (231 lb)     Exam:  Gen: Stocky gentleman in no distress, down 5 pounds from a year ago.  HEENT: No scleral icterus.  Neck: No jugular venous distention or thyromegaly.  Lungs: Clear to auscultation, with no wheezes or rales.  Heart: Regular rhythm without extrasystoles, murmurs or gallops.  Abdomen: Benign, with no organomegaly or masses.  Extremities: Intact distal pulses; no edema.  Neuro: No focal neurologic abnormalities.  Skin: No cutaneous lesions.    Impressions:  1.  Infrequent neurally-mediated near-syncope and syncope, on Levsin therapy which she actually takes orally rather than just sublingually   2.  Premature ventricular complexes in the absence of structural or ischemic heart disease.  This is felt to represent a benign problem.  3.  Other medical problems, including degenerative joint disease, gout, GERD, and mild obesity.    Recommendations:  1.  The patient will continue his current medical regimen.  2.  He will undergo a Lexiscan study to assure that his cardiac status is stable, that his PVCs are benign.  This will also help with clearance for his upcoming knee surgery.  3.  He is welcome to call the office to discuss Lexiscan results, and then will follow-up with me in 1 year.      Benitez Zaldivar MD

## 2023-11-28 ENCOUNTER — OFFICE VISIT (OUTPATIENT)
Dept: ORTHOPEDIC SURGERY | Facility: CLINIC | Age: 63
End: 2023-11-28
Payer: COMMERCIAL

## 2023-11-28 DIAGNOSIS — M17.12 PRIMARY OSTEOARTHRITIS OF LEFT KNEE: Primary | ICD-10-CM

## 2023-11-28 PROCEDURE — 20610 DRAIN/INJ JOINT/BURSA W/O US: CPT | Performed by: ORTHOPAEDIC SURGERY

## 2023-11-28 PROCEDURE — 2500000004 HC RX 250 GENERAL PHARMACY W/ HCPCS (ALT 636 FOR OP/ED): Mod: JZ | Performed by: ORTHOPAEDIC SURGERY

## 2023-11-28 PROCEDURE — 1036F TOBACCO NON-USER: CPT | Performed by: ORTHOPAEDIC SURGERY

## 2023-11-28 PROCEDURE — 99024 POSTOP FOLLOW-UP VISIT: CPT | Performed by: ORTHOPAEDIC SURGERY

## 2023-11-28 PROCEDURE — 20610 DRAIN/INJ JOINT/BURSA W/O US: CPT | Mod: LT | Performed by: ORTHOPAEDIC SURGERY

## 2023-11-28 RX ADMIN — Medication 48 MG: at 10:45

## 2023-11-28 NOTE — PROGRESS NOTES
Before aspiration/injection, the risks  of this procedure including but not limited to;  infection, local skin irritation, skin atrophy, calcification, continued pain or discomfort, elevated blood sugar, burning, failure to relieve pain, possible late infection were all discussed with the patient.  The patient verbalized understanding and consented to the procedure.   After informed consent was provided, patient identification was confirmed, and allergies were verified, the patient was appropriately positioned. The site was marked and time-out performed.  The injection site was prepped in the usual sterile manner to provide a sterile environment. The skin was anesthetized with ethyl chloride spray. The aspiration/injection was performed with standard technique. The needle was withdrawn and the puncture site was secured with a Band-Aid. The patient tolerated the procedure well without complication.   Post-procedure discomfort can be alleviated with additional medication, ice, elevation, and rest over the first 24 hours as recommended.  The injection was left knee synvisc one,  48mg/6ml

## 2023-12-13 ENCOUNTER — HOSPITAL ENCOUNTER (OUTPATIENT)
Dept: RADIOLOGY | Facility: HOSPITAL | Age: 63
Discharge: HOME | End: 2023-12-13
Payer: COMMERCIAL

## 2023-12-13 ENCOUNTER — HOSPITAL ENCOUNTER (OUTPATIENT)
Dept: CARDIOLOGY | Facility: HOSPITAL | Age: 63
Discharge: HOME | End: 2023-12-13
Payer: COMMERCIAL

## 2023-12-13 DIAGNOSIS — I49.3 PVC'S (PREMATURE VENTRICULAR CONTRACTIONS): ICD-10-CM

## 2023-12-13 PROCEDURE — 3430000001 HC RX 343 DIAGNOSTIC RADIOPHARMACEUTICALS: Performed by: INTERNAL MEDICINE

## 2023-12-13 PROCEDURE — A9502 TC99M TETROFOSMIN: HCPCS | Performed by: INTERNAL MEDICINE

## 2023-12-13 PROCEDURE — 2500000004 HC RX 250 GENERAL PHARMACY W/ HCPCS (ALT 636 FOR OP/ED): Performed by: INTERNAL MEDICINE

## 2023-12-13 PROCEDURE — 78452 HT MUSCLE IMAGE SPECT MULT: CPT

## 2023-12-13 PROCEDURE — 93016 CV STRESS TEST SUPVJ ONLY: CPT | Performed by: INTERNAL MEDICINE

## 2023-12-13 PROCEDURE — 93018 CV STRESS TEST I&R ONLY: CPT | Performed by: INTERNAL MEDICINE

## 2023-12-13 PROCEDURE — 78452 HT MUSCLE IMAGE SPECT MULT: CPT | Performed by: INTERNAL MEDICINE

## 2023-12-13 PROCEDURE — 93017 CV STRESS TEST TRACING ONLY: CPT

## 2023-12-13 RX ORDER — REGADENOSON 0.08 MG/ML
0.4 INJECTION, SOLUTION INTRAVENOUS ONCE
Status: COMPLETED | OUTPATIENT
Start: 2023-12-13 | End: 2023-12-13

## 2023-12-13 RX ADMIN — REGADENOSON 0.4 MG: 0.08 INJECTION, SOLUTION INTRAVENOUS at 08:44

## 2023-12-13 RX ADMIN — TETROFOSMIN 35.9 MILLICURIE: 0.23 INJECTION, POWDER, LYOPHILIZED, FOR SOLUTION INTRAVENOUS at 08:41

## 2023-12-13 RX ADMIN — TETROFOSMIN 10.5 MILLICURIE: 0.23 INJECTION, POWDER, LYOPHILIZED, FOR SOLUTION INTRAVENOUS at 07:40

## 2024-01-03 ENCOUNTER — LAB (OUTPATIENT)
Dept: LAB | Facility: LAB | Age: 64
End: 2024-01-03
Payer: MEDICARE

## 2024-01-03 DIAGNOSIS — E83.42 HYPOMAGNESEMIA: ICD-10-CM

## 2024-01-03 DIAGNOSIS — N18.2 CHRONIC KIDNEY DISEASE, STAGE 2 (MILD): Primary | ICD-10-CM

## 2024-01-03 DIAGNOSIS — E78.49 OTHER HYPERLIPIDEMIA: ICD-10-CM

## 2024-01-03 LAB
ALBUMIN SERPL BCP-MCNC: 4.3 G/DL (ref 3.4–5)
ALP SERPL-CCNC: 62 U/L (ref 33–136)
ALT SERPL W P-5'-P-CCNC: 16 U/L (ref 10–52)
ANION GAP SERPL CALC-SCNC: 12 MMOL/L (ref 10–20)
AST SERPL W P-5'-P-CCNC: 19 U/L (ref 9–39)
BASOPHILS # BLD AUTO: 0.05 X10*3/UL (ref 0–0.1)
BASOPHILS NFR BLD AUTO: 0.5 %
BILIRUB SERPL-MCNC: 0.8 MG/DL (ref 0–1.2)
BUN SERPL-MCNC: 24 MG/DL (ref 6–23)
CALCIUM SERPL-MCNC: 9.6 MG/DL (ref 8.6–10.3)
CHLORIDE SERPL-SCNC: 105 MMOL/L (ref 98–107)
CHOLEST SERPL-MCNC: 168 MG/DL (ref 0–199)
CHOLESTEROL/HDL RATIO: 3.2
CO2 SERPL-SCNC: 28 MMOL/L (ref 21–32)
CREAT SERPL-MCNC: 1.04 MG/DL (ref 0.5–1.3)
EOSINOPHIL # BLD AUTO: 0.36 X10*3/UL (ref 0–0.7)
EOSINOPHIL NFR BLD AUTO: 3.7 %
ERYTHROCYTE [DISTWIDTH] IN BLOOD BY AUTOMATED COUNT: 13.2 % (ref 11.5–14.5)
GFR SERPL CREATININE-BSD FRML MDRD: 80 ML/MIN/1.73M*2
GLUCOSE SERPL-MCNC: 83 MG/DL (ref 74–99)
HCT VFR BLD AUTO: 44.1 % (ref 41–52)
HDLC SERPL-MCNC: 53.2 MG/DL
HGB BLD-MCNC: 14.2 G/DL (ref 13.5–17.5)
IMM GRANULOCYTES # BLD AUTO: 0.02 X10*3/UL (ref 0–0.7)
IMM GRANULOCYTES NFR BLD AUTO: 0.2 % (ref 0–0.9)
LDLC SERPL CALC-MCNC: 100 MG/DL
LYMPHOCYTES # BLD AUTO: 2.22 X10*3/UL (ref 1.2–4.8)
LYMPHOCYTES NFR BLD AUTO: 22.9 %
MAGNESIUM SERPL-MCNC: 2.09 MG/DL (ref 1.6–2.4)
MCH RBC QN AUTO: 31 PG (ref 26–34)
MCHC RBC AUTO-ENTMCNC: 32.2 G/DL (ref 32–36)
MCV RBC AUTO: 96 FL (ref 80–100)
MONOCYTES # BLD AUTO: 0.76 X10*3/UL (ref 0.1–1)
MONOCYTES NFR BLD AUTO: 7.9 %
NEUTROPHILS # BLD AUTO: 6.27 X10*3/UL (ref 1.2–7.7)
NEUTROPHILS NFR BLD AUTO: 64.8 %
NON HDL CHOLESTEROL: 115 MG/DL (ref 0–149)
NRBC BLD-RTO: 0 /100 WBCS (ref 0–0)
PLATELET # BLD AUTO: 155 X10*3/UL (ref 150–450)
POTASSIUM SERPL-SCNC: 4.4 MMOL/L (ref 3.5–5.3)
PROT SERPL-MCNC: 7.2 G/DL (ref 6.4–8.2)
RBC # BLD AUTO: 4.58 X10*6/UL (ref 4.5–5.9)
SODIUM SERPL-SCNC: 141 MMOL/L (ref 136–145)
TRIGL SERPL-MCNC: 74 MG/DL (ref 0–149)
VLDL: 15 MG/DL (ref 0–40)
WBC # BLD AUTO: 9.7 X10*3/UL (ref 4.4–11.3)

## 2024-01-03 PROCEDURE — 80061 LIPID PANEL: CPT

## 2024-01-03 PROCEDURE — 83735 ASSAY OF MAGNESIUM: CPT

## 2024-01-03 PROCEDURE — 80053 COMPREHEN METABOLIC PANEL: CPT

## 2024-01-03 PROCEDURE — 36415 COLL VENOUS BLD VENIPUNCTURE: CPT

## 2024-01-03 PROCEDURE — 85025 COMPLETE CBC W/AUTO DIFF WBC: CPT

## 2024-01-18 ENCOUNTER — TRANSCRIBE ORDERS (OUTPATIENT)
Dept: ORTHOPEDIC SURGERY | Facility: CLINIC | Age: 64
End: 2024-01-18
Payer: MEDICARE

## 2024-01-18 DIAGNOSIS — M17.12 UNILATERAL PRIMARY OSTEOARTHRITIS, LEFT KNEE: Primary | ICD-10-CM

## 2024-01-19 PROBLEM — M17.12 UNILATERAL PRIMARY OSTEOARTHRITIS, LEFT KNEE: Status: ACTIVE | Noted: 2024-01-18

## 2024-02-01 ENCOUNTER — HOSPITAL ENCOUNTER (OUTPATIENT)
Dept: RADIOLOGY | Facility: HOSPITAL | Age: 64
Discharge: HOME | End: 2024-02-01
Payer: MEDICARE

## 2024-02-01 ENCOUNTER — PRE-ADMISSION TESTING (OUTPATIENT)
Dept: PREADMISSION TESTING | Facility: HOSPITAL | Age: 64
End: 2024-02-01
Payer: MEDICARE

## 2024-02-01 VITALS
WEIGHT: 229.94 LBS | HEIGHT: 70 IN | HEART RATE: 68 BPM | OXYGEN SATURATION: 98 % | SYSTOLIC BLOOD PRESSURE: 130 MMHG | RESPIRATION RATE: 18 BRPM | BODY MASS INDEX: 32.92 KG/M2 | DIASTOLIC BLOOD PRESSURE: 77 MMHG

## 2024-02-01 DIAGNOSIS — M17.12 UNILATERAL PRIMARY OSTEOARTHRITIS, LEFT KNEE: ICD-10-CM

## 2024-02-01 LAB
ALBUMIN SERPL BCP-MCNC: 4.5 G/DL (ref 3.4–5)
ALP SERPL-CCNC: 71 U/L (ref 33–136)
ALT SERPL W P-5'-P-CCNC: 15 U/L (ref 10–52)
ANION GAP SERPL CALC-SCNC: 13 MMOL/L (ref 10–20)
APPEARANCE UR: CLEAR
AST SERPL W P-5'-P-CCNC: 16 U/L (ref 9–39)
BASOPHILS # BLD AUTO: 0.04 X10*3/UL (ref 0–0.1)
BASOPHILS NFR BLD AUTO: 0.5 %
BILIRUB SERPL-MCNC: 0.6 MG/DL (ref 0–1.2)
BILIRUB UR STRIP.AUTO-MCNC: NEGATIVE MG/DL
BUN SERPL-MCNC: 31 MG/DL (ref 6–23)
CALCIUM SERPL-MCNC: 9.6 MG/DL (ref 8.6–10.3)
CHLORIDE SERPL-SCNC: 106 MMOL/L (ref 98–107)
CO2 SERPL-SCNC: 28 MMOL/L (ref 21–32)
COLOR UR: YELLOW
CREAT SERPL-MCNC: 1.16 MG/DL (ref 0.5–1.3)
EGFRCR SERPLBLD CKD-EPI 2021: 70 ML/MIN/1.73M*2
EOSINOPHIL # BLD AUTO: 0.31 X10*3/UL (ref 0–0.7)
EOSINOPHIL NFR BLD AUTO: 4.2 %
ERYTHROCYTE [DISTWIDTH] IN BLOOD BY AUTOMATED COUNT: 13.6 % (ref 11.5–14.5)
GLUCOSE SERPL-MCNC: 100 MG/DL (ref 74–99)
GLUCOSE UR STRIP.AUTO-MCNC: NEGATIVE MG/DL
HCT VFR BLD AUTO: 44 % (ref 41–52)
HGB BLD-MCNC: 14.4 G/DL (ref 13.5–17.5)
HOLD SPECIMEN: NORMAL
HYALINE CASTS #/AREA URNS AUTO: ABNORMAL /LPF
IMM GRANULOCYTES # BLD AUTO: 0.02 X10*3/UL (ref 0–0.7)
IMM GRANULOCYTES NFR BLD AUTO: 0.3 % (ref 0–0.9)
INR PPP: 1 (ref 0.9–1.1)
KETONES UR STRIP.AUTO-MCNC: ABNORMAL MG/DL
LEUKOCYTE ESTERASE UR QL STRIP.AUTO: NEGATIVE
LYMPHOCYTES # BLD AUTO: 2.13 X10*3/UL (ref 1.2–4.8)
LYMPHOCYTES NFR BLD AUTO: 28.6 %
MCH RBC QN AUTO: 31.3 PG (ref 26–34)
MCHC RBC AUTO-ENTMCNC: 32.7 G/DL (ref 32–36)
MCV RBC AUTO: 96 FL (ref 80–100)
MONOCYTES # BLD AUTO: 0.62 X10*3/UL (ref 0.1–1)
MONOCYTES NFR BLD AUTO: 8.3 %
MUCOUS THREADS #/AREA URNS AUTO: ABNORMAL /LPF
NEUTROPHILS # BLD AUTO: 4.33 X10*3/UL (ref 1.2–7.7)
NEUTROPHILS NFR BLD AUTO: 58.1 %
NITRITE UR QL STRIP.AUTO: NEGATIVE
NRBC BLD-RTO: 0 /100 WBCS (ref 0–0)
PH UR STRIP.AUTO: 5 [PH]
PLATELET # BLD AUTO: 143 X10*3/UL (ref 150–450)
POTASSIUM SERPL-SCNC: 4.5 MMOL/L (ref 3.5–5.3)
PROT SERPL-MCNC: 7.3 G/DL (ref 6.4–8.2)
PROT UR STRIP.AUTO-MCNC: ABNORMAL MG/DL
PROTHROMBIN TIME: 10.8 SECONDS (ref 9.8–12.8)
RBC # BLD AUTO: 4.6 X10*6/UL (ref 4.5–5.9)
RBC # UR STRIP.AUTO: NEGATIVE /UL
RBC #/AREA URNS AUTO: ABNORMAL /HPF
SODIUM SERPL-SCNC: 142 MMOL/L (ref 136–145)
SP GR UR STRIP.AUTO: 1.02
UROBILINOGEN UR STRIP.AUTO-MCNC: <2 MG/DL
WBC # BLD AUTO: 7.5 X10*3/UL (ref 4.4–11.3)
WBC #/AREA URNS AUTO: ABNORMAL /HPF

## 2024-02-01 PROCEDURE — 81001 URINALYSIS AUTO W/SCOPE: CPT

## 2024-02-01 PROCEDURE — 85025 COMPLETE CBC W/AUTO DIFF WBC: CPT

## 2024-02-01 PROCEDURE — 87081 CULTURE SCREEN ONLY: CPT | Mod: ELYLAB

## 2024-02-01 PROCEDURE — 73700 CT LOWER EXTREMITY W/O DYE: CPT | Mod: LT

## 2024-02-01 PROCEDURE — 93005 ELECTROCARDIOGRAM TRACING: CPT

## 2024-02-01 PROCEDURE — 85610 PROTHROMBIN TIME: CPT

## 2024-02-01 PROCEDURE — 80053 COMPREHEN METABOLIC PANEL: CPT

## 2024-02-01 PROCEDURE — 36415 COLL VENOUS BLD VENIPUNCTURE: CPT

## 2024-02-01 RX ORDER — MINERAL OIL
180 ENEMA (ML) RECTAL DAILY
COMMUNITY
Start: 2019-01-11

## 2024-02-01 RX ORDER — VIT C/E/ZN/COPPR/LUTEIN/ZEAXAN 250MG-90MG
25 CAPSULE ORAL DAILY
COMMUNITY

## 2024-02-01 RX ORDER — ACETAMINOPHEN 500 MG
500 TABLET ORAL EVERY 6 HOURS PRN
COMMUNITY

## 2024-02-01 RX ORDER — HYDROXYCHLOROQUINE SULFATE 200 MG/1
1 TABLET, FILM COATED ORAL DAILY
COMMUNITY
Start: 2022-12-15

## 2024-02-01 RX ORDER — TRIAMCINOLONE ACETONIDE 1 MG/G
CREAM TOPICAL 2 TIMES DAILY PRN
COMMUNITY
Start: 2023-02-17

## 2024-02-01 RX ORDER — HYDROCORTISONE 25 MG/G
OINTMENT TOPICAL 2 TIMES DAILY PRN
COMMUNITY
Start: 2024-01-24 | End: 2025-01-23

## 2024-02-01 NOTE — PREPROCEDURE INSTRUCTIONS
Patient here for PAT visit; labs and MRSA swab obtained. G skin wipes and joint instruction book given to and reviewed with patient. Written pre-op instructions reviewed with patient. Aware to hold Celebrex and vit E. NPO after midnight. Patient has walker available.

## 2024-02-03 LAB — STAPHYLOCOCCUS SPEC CULT: NORMAL

## 2024-02-06 ENCOUNTER — EVALUATION (OUTPATIENT)
Dept: PHYSICAL THERAPY | Facility: CLINIC | Age: 64
End: 2024-02-06
Payer: MEDICARE

## 2024-02-06 ENCOUNTER — OFFICE VISIT (OUTPATIENT)
Dept: ORTHOPEDIC SURGERY | Facility: CLINIC | Age: 64
End: 2024-02-06
Payer: MEDICARE

## 2024-02-06 DIAGNOSIS — M17.12 PRIMARY OSTEOARTHRITIS OF LEFT KNEE: Primary | ICD-10-CM

## 2024-02-06 DIAGNOSIS — M17.12 PRIMARY OSTEOARTHRITIS OF LEFT KNEE: ICD-10-CM

## 2024-02-06 DIAGNOSIS — M17.12 UNILATERAL PRIMARY OSTEOARTHRITIS, LEFT KNEE: Primary | ICD-10-CM

## 2024-02-06 PROCEDURE — E0143 WALKER FOLDING WHEELED W/O S: HCPCS | Performed by: ORTHOPAEDIC SURGERY

## 2024-02-06 PROCEDURE — 99024 POSTOP FOLLOW-UP VISIT: CPT | Performed by: ORTHOPAEDIC SURGERY

## 2024-02-06 PROCEDURE — E0218 FLUID CIRC COLD PAD W PUMP: HCPCS | Performed by: ORTHOPAEDIC SURGERY

## 2024-02-06 PROCEDURE — 1159F MED LIST DOCD IN RCRD: CPT | Performed by: ORTHOPAEDIC SURGERY

## 2024-02-06 PROCEDURE — 1036F TOBACCO NON-USER: CPT | Performed by: ORTHOPAEDIC SURGERY

## 2024-02-06 PROCEDURE — 97161 PT EVAL LOW COMPLEX 20 MIN: CPT | Mod: GP

## 2024-02-06 PROCEDURE — 1125F AMNT PAIN NOTED PAIN PRSNT: CPT | Performed by: ORTHOPAEDIC SURGERY

## 2024-02-06 PROCEDURE — 97535 SELF CARE MNGMENT TRAINING: CPT | Mod: GP

## 2024-02-06 ASSESSMENT — PAIN - FUNCTIONAL ASSESSMENT: PAIN_FUNCTIONAL_ASSESSMENT: 0-10

## 2024-02-06 ASSESSMENT — PAIN SCALES - GENERAL: PAINLEVEL_OUTOF10: 6

## 2024-02-06 NOTE — PROGRESS NOTES
Physical Therapy Evaluation    Patient Name: Vinicius Gallagher  MRN: [id number]    Current Problem  1. Unilateral primary osteoarthritis, left knee            Insurance reviewed   Visit number: 1  Approved number of visits: N   [employer] MEDICARE BMN     Subjective   General:  Pt is a 64 y.o. M that presents for pre-op education for L TKR with Dr. De Luna on Feb 12, 2024. L knee has been bothering him for over 6 years. Originally had a mensicus tear in same knee after slipping. Has been receiving gel injections since then.      Pain:  6/10, achy  9/10 during twisting, sharp shooting- worst   2/10 in AM- at best     Aggravated by: stairs, standing/walking for long periods, twisting   Relieved by: ice, rest     Reviewed medical screening form with pt and medical screening assessed      Objective   Transfers: WNL   Gait: WNL     Knee AROM (* indicates pain)  WNL- crepitus with knee flexion     Knee MMT (* indicates pain)  Flexion 4/5 L ; 4+/5 R  Extension 4+/5 L ; 5/5 R    Hip ROM (* indicates pain)  WNL     Hip MMT (* indicates pain)  Flex: 5/5  ABD: L 4-/5 ; R 4+/5    Palpation: along joint line   Patellar Mobility: hypo       Treatment: See HEP below    EDUCATION/HEP:    Assessment:  PT Pre-Op Assessment  Patient demonstrates independent knowledge and understanding of: anatomy, surgical procedure, post-operative exercise programs, signs and symptoms of post-operative infection and post-operative management of pain.    Patient demonstrates good safety awareness and modified independence with ambulation: gait pattern 3PPG with FWW weight bearing on LLE, on level surfaces and on stairs    Handouts Given: surgical overview booklet, post-operative exercises, gait training instructions       Clinical Presentation: Stable     Level of Complexity: Low     Goals:  N/a- will establish after surgery     Plan   Patient will be placed on hold for therapy until after completion of surgical procedure. Upon return to therapy, patient's  status will be re-assessed and goal updated    Patient plans for post op follow up at 2/28/24 at 8:15a     Racheal Wong, PT

## 2024-02-06 NOTE — PROGRESS NOTES
Subjective    Patient ID: Vinicius    Chief Complaint:   Chief Complaint   Patient presents with    Left Knee - Pre-op Visit     Tka afsaneh 2/12     This patient has pain in the knee that is worsening.  The pain increases with activity and with weightbearing, and interferes with daily activities.  There is pain with range of motion, limited range of motion, crepitus and swelling.  The x-ray demonstrates joint space narrowing, osteophyte formation, and subchondral sclerosis.  The symptoms have worsened in spite of extensive medical treatment, therapy, and external support over at least the past 3 months.     This is the preop visit to discuss the risks and benefits of the total knee replacement surgery.  These risks were fully explained to the patient.  With this, and any surgery, infection is a risk, this is usually 1 to 2%, even higher in diabetics, persons with rheumatoid arthritis, previous surgeries, on oral steroid medications, and obesity.  All of these issues were properly addressed.  We always assure all sterile techniques will be followed.  Patient will also need to be on antibiotics for the next 2 years for any minor surgery, even dental work.  For high risk patients, this will be for lifetime.  Severe infections may require removal of the prosthesis.     It was also explained to the patient that there will be some minor blood loss during the procedure and a blood transfusion may be recommended if medically necessary.  It is also noted that with knee surgery a tourniquet is applied to the lower extremity to limit blood loss during the procedure.     Pulmonary embolism and blood clots are also discussed with the patient.  We discussed that these can be fatal complications to the surgery.  It is explained to the patient that the use of thromboembolic stockings, foot pumps, incentive spirometer's, early mobility, and the use of blood thinners for period of time is the standard of care for prevention of blood  clots.     Patient's preoperative range of motion is 0-115 degrees.  It is explained to the patient that this type of surgery is to decrease arthritis pain and sometimes stiffness may occur.  It is important that the patient go to physical therapy postoperatively.  It is also stated that occasionally we will have to manipulate the knee if pain and stiffness persists.     Loosening and wear of the prosthesis are also discussed.  The prosthesis normally last between 12 and 15 years.  Revisions may be more complicated and with higher risk.     Fractures, though rare, may also occur intraoperatively.  These fractures may be to the tibia or to the femur.  There may be nerve or arterial injuries as well and these are discussed in detail.     Lastly, the benefits of spinal anesthesia were explained to the patient.     All of the patient's questions and concerns were answered.     This note was prepared using voice recognition software.  The details of this note are correct and have been reviewed, and corrected to the best of my ability.  Some grammatical areas may persist related to the Dragon software     Otf Schmid PA-C     [phone]

## 2024-02-09 ENCOUNTER — ANESTHESIA EVENT (OUTPATIENT)
Dept: OPERATING ROOM | Facility: HOSPITAL | Age: 64
End: 2024-02-09
Payer: MEDICARE

## 2024-02-09 PROBLEM — G47.33 OSA (OBSTRUCTIVE SLEEP APNEA): Status: ACTIVE | Noted: 2024-02-09

## 2024-02-09 SDOH — HEALTH STABILITY: MENTAL HEALTH: CURRENT SMOKER: 0

## 2024-02-09 NOTE — ANESTHESIA PREPROCEDURE EVALUATION
Vinicius Gallagher is a 64 y.o. male here for:    Arthroplasty Total Knee Robot-Assisted  With Antonio De Luna MD  Pre-Op Diagnosis Codes:     * Unilateral primary osteoarthritis, left knee [M17.12]    Relevant Problems   Cardiovascular  Nuclear stress 11/1/2023   (+) PVC's (premature ventricular contractions)      Endocrine   (+) Obese      GI   (+) GERD (gastroesophageal reflux disease)      Pulmonary   (+) MUSTAPHA (obstructive sleep apnea)      Musculoskeletal   (+) Knee osteoarthritis   (+) Unilateral primary osteoarthritis, left knee       Lab Results   Component Value Date    HGB 14.4 02/01/2024    HCT 44.0 02/01/2024    WBC 7.5 02/01/2024     (L) 02/01/2024     02/01/2024    K 4.5 02/01/2024     02/01/2024    CREATININE 1.16 02/01/2024    BUN 31 (H) 02/01/2024       Social History     Tobacco Use   Smoking Status Never   Smokeless Tobacco Never       Allergies   Allergen Reactions    Monosodium Glutamate Swelling     Throat swelling    Sulfur Dioxide Swelling     Hector, throat swelling    Grass Pollen Rash    Tree And Shrub Pollen Rash       Current Outpatient Medications   Medication Instructions    acetaminophen (TYLENOL) 500 mg, oral, Every 6 hours PRN    allopurinol (Zyloprim) 100 mg tablet 1 tablet, oral, Daily    celecoxib (CELEBREX) 50 mg, oral, Daily    cholecalciferol (VITAMIN D3) 25 mcg, oral, Daily    fexofenadine (ALLEGRA) 180 mg, oral, Daily    glucosam james dip-chondroit-C-Mn 239-279-56-3 mg capsule oral    hydrocortisone 2.5 % ointment 2 times daily PRN    hydroxychloroquine (Plaquenil) 200 mg tablet 1 tablet, oral, Daily    hyoscyamine (ANASPAZ) 0.125 mg, sublingual, Daily    multivit-min/folic acid/vit K1 (MULTI FOR HIM, NO IRON, ORAL) oral    pantoprazole (ProtoNix) 40 mg EC tablet 1 tablet, oral, Daily    triamcinolone (Kenalog) 0.1 % cream 2 times daily PRN    vitamin E acid succinate (vitamin E succinate) 268 mg (400 unit) tablet oral       Past Surgical History:   Procedure  Laterality Date    COLONOSCOPY      HAND SURGERY Left     HERNIA REPAIR      KNEE ARTHROSCOPY W/ MENISCAL REPAIR Left     TONSILLECTOMY         Family History   Problem Relation Name Age of Onset    No Known Problems Mother      Other (CABG) Father      Coronary artery disease Father      Other (PTCA) Brother      Other (SEPTIC MYOCARDITIS) Brother      Other (SEPTICEMIA) Brother      Heart attack Maternal Grandmother      Heart attack Paternal Grandfather         NPO Details:  No data recorded    Physical Exam    Airway  Mallampati: II  TM distance: >3 FB  Neck ROM: full     Cardiovascular - normal exam     Dental - normal exam     Pulmonary - normal exam     Abdominal            Anesthesia Plan    History of general anesthesia?: yes  History of complications of general anesthesia?: no    ASA 2     regional, spinal and MAC     The patient is not a current smoker.    intravenous induction   Postoperative administration of opioids is intended.  Anesthetic plan and risks discussed with patient.

## 2024-02-12 ENCOUNTER — HOSPITAL ENCOUNTER (OUTPATIENT)
Facility: HOSPITAL | Age: 64
Discharge: HOME | End: 2024-02-13
Attending: ORTHOPAEDIC SURGERY | Admitting: ORTHOPAEDIC SURGERY
Payer: MEDICARE

## 2024-02-12 ENCOUNTER — APPOINTMENT (OUTPATIENT)
Dept: RADIOLOGY | Facility: HOSPITAL | Age: 64
End: 2024-02-12
Payer: MEDICARE

## 2024-02-12 ENCOUNTER — ANESTHESIA (OUTPATIENT)
Dept: OPERATING ROOM | Facility: HOSPITAL | Age: 64
End: 2024-02-12
Payer: MEDICARE

## 2024-02-12 DIAGNOSIS — Z96.652 S/P TOTAL KNEE ARTHROPLASTY, LEFT: ICD-10-CM

## 2024-02-12 DIAGNOSIS — M17.12 UNILATERAL PRIMARY OSTEOARTHRITIS, LEFT KNEE: Primary | ICD-10-CM

## 2024-02-12 PROCEDURE — 7100000001 HC RECOVERY ROOM TIME - INITIAL BASE CHARGE: Performed by: ORTHOPAEDIC SURGERY

## 2024-02-12 PROCEDURE — 73560 X-RAY EXAM OF KNEE 1 OR 2: CPT | Mod: LT

## 2024-02-12 PROCEDURE — 97161 PT EVAL LOW COMPLEX 20 MIN: CPT | Mod: GP | Performed by: PHYSICAL THERAPIST

## 2024-02-12 PROCEDURE — 97116 GAIT TRAINING THERAPY: CPT | Mod: GP | Performed by: PHYSICAL THERAPIST

## 2024-02-12 PROCEDURE — 2780000003 HC OR 278 NO HCPCS: Performed by: ORTHOPAEDIC SURGERY

## 2024-02-12 PROCEDURE — 7100000011 HC EXTENDED STAY RECOVERY HOURLY - NURSING UNIT

## 2024-02-12 PROCEDURE — 2500000005 HC RX 250 GENERAL PHARMACY W/O HCPCS: Performed by: STUDENT IN AN ORGANIZED HEALTH CARE EDUCATION/TRAINING PROGRAM

## 2024-02-12 PROCEDURE — 7100000002 HC RECOVERY ROOM TIME - EACH INCREMENTAL 1 MINUTE: Performed by: ORTHOPAEDIC SURGERY

## 2024-02-12 PROCEDURE — RXMED WILLOW AMBULATORY MEDICATION CHARGE

## 2024-02-12 PROCEDURE — 27447 TOTAL KNEE ARTHROPLASTY: CPT | Performed by: ORTHOPAEDIC SURGERY

## 2024-02-12 PROCEDURE — 2500000002 HC RX 250 W HCPCS SELF ADMINISTERED DRUGS (ALT 637 FOR MEDICARE OP, ALT 636 FOR OP/ED): Performed by: ORTHOPAEDIC SURGERY

## 2024-02-12 PROCEDURE — 2720000007 HC OR 272 NO HCPCS: Performed by: ORTHOPAEDIC SURGERY

## 2024-02-12 PROCEDURE — 2500000004 HC RX 250 GENERAL PHARMACY W/ HCPCS (ALT 636 FOR OP/ED): Performed by: ORTHOPAEDIC SURGERY

## 2024-02-12 PROCEDURE — 27447 TOTAL KNEE ARTHROPLASTY: CPT | Performed by: PHYSICIAN ASSISTANT

## 2024-02-12 PROCEDURE — 97110 THERAPEUTIC EXERCISES: CPT | Mod: GP | Performed by: PHYSICAL THERAPIST

## 2024-02-12 PROCEDURE — 73560 X-RAY EXAM OF KNEE 1 OR 2: CPT | Mod: LEFT SIDE | Performed by: RADIOLOGY

## 2024-02-12 PROCEDURE — 3600000018 HC OR TIME - INITIAL BASE CHARGE - PROCEDURE LEVEL SIX: Performed by: ORTHOPAEDIC SURGERY

## 2024-02-12 PROCEDURE — C1776 JOINT DEVICE (IMPLANTABLE): HCPCS | Performed by: ORTHOPAEDIC SURGERY

## 2024-02-12 PROCEDURE — 2500000004 HC RX 250 GENERAL PHARMACY W/ HCPCS (ALT 636 FOR OP/ED): Performed by: STUDENT IN AN ORGANIZED HEALTH CARE EDUCATION/TRAINING PROGRAM

## 2024-02-12 PROCEDURE — 3700000001 HC GENERAL ANESTHESIA TIME - INITIAL BASE CHARGE: Performed by: ORTHOPAEDIC SURGERY

## 2024-02-12 PROCEDURE — C1713 ANCHOR/SCREW BN/BN,TIS/BN: HCPCS | Performed by: ORTHOPAEDIC SURGERY

## 2024-02-12 PROCEDURE — 2500000005 HC RX 250 GENERAL PHARMACY W/O HCPCS: Performed by: ORTHOPAEDIC SURGERY

## 2024-02-12 PROCEDURE — 2500000001 HC RX 250 WO HCPCS SELF ADMINISTERED DRUGS (ALT 637 FOR MEDICARE OP): Performed by: REGISTERED NURSE

## 2024-02-12 PROCEDURE — 2500000001 HC RX 250 WO HCPCS SELF ADMINISTERED DRUGS (ALT 637 FOR MEDICARE OP): Performed by: INTERNAL MEDICINE

## 2024-02-12 PROCEDURE — 2500000001 HC RX 250 WO HCPCS SELF ADMINISTERED DRUGS (ALT 637 FOR MEDICARE OP): Performed by: ORTHOPAEDIC SURGERY

## 2024-02-12 PROCEDURE — A4217 STERILE WATER/SALINE, 500 ML: HCPCS | Performed by: ORTHOPAEDIC SURGERY

## 2024-02-12 PROCEDURE — 20985 CPTR-ASST DIR MS PX: CPT | Performed by: PHYSICIAN ASSISTANT

## 2024-02-12 PROCEDURE — 3700000002 HC GENERAL ANESTHESIA TIME - EACH INCREMENTAL 1 MINUTE: Performed by: ORTHOPAEDIC SURGERY

## 2024-02-12 PROCEDURE — 20985 CPTR-ASST DIR MS PX: CPT | Performed by: ORTHOPAEDIC SURGERY

## 2024-02-12 PROCEDURE — 3600000017 HC OR TIME - EACH INCREMENTAL 1 MINUTE - PROCEDURE LEVEL SIX: Performed by: ORTHOPAEDIC SURGERY

## 2024-02-12 DEVICE — PRIMARY TIBIAL BASEPLATE
Type: IMPLANTABLE DEVICE | Site: KNEE | Status: FUNCTIONAL
Brand: TRIATHLON

## 2024-02-12 DEVICE — CRUCIATE RETAINING FEMORAL
Type: IMPLANTABLE DEVICE | Site: KNEE | Status: FUNCTIONAL
Brand: TRIATHLON

## 2024-02-12 DEVICE — TIBIAL BEARING INSERT - CS
Type: IMPLANTABLE DEVICE | Site: KNEE | Status: FUNCTIONAL
Brand: TRIATHLON

## 2024-02-12 DEVICE — PATELLA
Type: IMPLANTABLE DEVICE | Site: KNEE | Status: FUNCTIONAL
Brand: TRIATHLON

## 2024-02-12 DEVICE — SIMPLEX® HV IS A FAST-SETTING ACRYLIC RESIN FOR USE IN BONE SURGERY. MIXING THE TWO SEPARATE STERILE COMPONENTS PRODUCES A DUCTILE BONE CEMENT WHICH, AFTER HARDENING, FIXES THE IMPLANT AND TRANSFERS STRESSES PRODUCED DURING MOVEMENT EVENLY TO THE BONE. SIMPLEX® HV CEMENT POWDER ALSO CONTAINS INSOLUBLE ZIRCONIUM DIOXIDE AS AN X-RAY CONTRAST MEDIUM. SIMPLEX® HV DOES NOT EMIT A SIGNAL AND DOES NOT POSE A SAFETY RISK IN A MAGNETIC RESONANCE ENVIRONMENT.
Type: IMPLANTABLE DEVICE | Site: KNEE | Status: FUNCTIONAL
Brand: SIMPLEX HV

## 2024-02-12 RX ORDER — MIDAZOLAM HYDROCHLORIDE 1 MG/ML
INJECTION, SOLUTION INTRAMUSCULAR; INTRAVENOUS AS NEEDED
Status: DISCONTINUED | OUTPATIENT
Start: 2024-02-12 | End: 2024-02-12

## 2024-02-12 RX ORDER — NALOXONE HYDROCHLORIDE 1 MG/ML
0.2 INJECTION INTRAMUSCULAR; INTRAVENOUS; SUBCUTANEOUS EVERY 5 MIN PRN
Status: DISCONTINUED | OUTPATIENT
Start: 2024-02-12 | End: 2024-02-13 | Stop reason: HOSPADM

## 2024-02-12 RX ORDER — SODIUM CHLORIDE, SODIUM LACTATE, POTASSIUM CHLORIDE, CALCIUM CHLORIDE 600; 310; 30; 20 MG/100ML; MG/100ML; MG/100ML; MG/100ML
50 INJECTION, SOLUTION INTRAVENOUS CONTINUOUS
Status: DISCONTINUED | OUTPATIENT
Start: 2024-02-12 | End: 2024-02-13 | Stop reason: HOSPADM

## 2024-02-12 RX ORDER — CEFAZOLIN SODIUM 2 G/100ML
2 INJECTION, SOLUTION INTRAVENOUS ONCE
Status: COMPLETED | OUTPATIENT
Start: 2024-02-12 | End: 2024-02-12

## 2024-02-12 RX ORDER — ONDANSETRON HYDROCHLORIDE 2 MG/ML
4 INJECTION, SOLUTION INTRAVENOUS ONCE AS NEEDED
Status: DISCONTINUED | OUTPATIENT
Start: 2024-02-12 | End: 2024-02-12 | Stop reason: HOSPADM

## 2024-02-12 RX ORDER — ONDANSETRON HYDROCHLORIDE 2 MG/ML
4 INJECTION, SOLUTION INTRAVENOUS EVERY 8 HOURS PRN
Status: DISCONTINUED | OUTPATIENT
Start: 2024-02-12 | End: 2024-02-13 | Stop reason: HOSPADM

## 2024-02-12 RX ORDER — DIPHENHYDRAMINE HCL 25 MG
25 TABLET ORAL EVERY 6 HOURS PRN
Status: DISCONTINUED | OUTPATIENT
Start: 2024-02-12 | End: 2024-02-13 | Stop reason: HOSPADM

## 2024-02-12 RX ORDER — OXYCODONE HYDROCHLORIDE 5 MG/1
5 TABLET ORAL EVERY 4 HOURS PRN
Status: DISCONTINUED | OUTPATIENT
Start: 2024-02-12 | End: 2024-02-13 | Stop reason: HOSPADM

## 2024-02-12 RX ORDER — CEFAZOLIN SODIUM 2 G/100ML
2 INJECTION, SOLUTION INTRAVENOUS EVERY 8 HOURS
Status: COMPLETED | OUTPATIENT
Start: 2024-02-12 | End: 2024-02-13

## 2024-02-12 RX ORDER — FENTANYL CITRATE 50 UG/ML
25 INJECTION, SOLUTION INTRAMUSCULAR; INTRAVENOUS EVERY 5 MIN PRN
Status: DISCONTINUED | OUTPATIENT
Start: 2024-02-12 | End: 2024-02-12 | Stop reason: HOSPADM

## 2024-02-12 RX ORDER — FAMOTIDINE 20 MG/1
20 TABLET, FILM COATED ORAL DAILY
COMMUNITY

## 2024-02-12 RX ORDER — BISACODYL 5 MG
10 TABLET, DELAYED RELEASE (ENTERIC COATED) ORAL DAILY PRN
Status: DISCONTINUED | OUTPATIENT
Start: 2024-02-12 | End: 2024-02-13 | Stop reason: HOSPADM

## 2024-02-12 RX ORDER — ACETAMINOPHEN 325 MG/1
500 TABLET ORAL EVERY 6 HOURS PRN
Status: DISCONTINUED | OUTPATIENT
Start: 2024-02-12 | End: 2024-02-13 | Stop reason: HOSPADM

## 2024-02-12 RX ORDER — PROPOFOL 10 MG/ML
INJECTION, EMULSION INTRAVENOUS CONTINUOUS PRN
Status: DISCONTINUED | OUTPATIENT
Start: 2024-02-12 | End: 2024-02-12

## 2024-02-12 RX ORDER — TRANEXAMIC ACID 650 MG/1
1950 TABLET ORAL ONCE
Status: COMPLETED | OUTPATIENT
Start: 2024-02-12 | End: 2024-02-12

## 2024-02-12 RX ORDER — KETOROLAC TROMETHAMINE 30 MG/ML
15 INJECTION, SOLUTION INTRAMUSCULAR; INTRAVENOUS EVERY 6 HOURS
Status: DISCONTINUED | OUTPATIENT
Start: 2024-02-12 | End: 2024-02-13 | Stop reason: HOSPADM

## 2024-02-12 RX ORDER — WATER 1 ML/ML
IRRIGANT IRRIGATION AS NEEDED
Status: DISCONTINUED | OUTPATIENT
Start: 2024-02-12 | End: 2024-02-12 | Stop reason: HOSPADM

## 2024-02-12 RX ORDER — CELECOXIB 50 MG/1
50 CAPSULE ORAL DAILY
Status: DISCONTINUED | OUTPATIENT
Start: 2024-02-12 | End: 2024-02-13 | Stop reason: HOSPADM

## 2024-02-12 RX ORDER — SODIUM CHLORIDE 0.9 G/100ML
IRRIGANT IRRIGATION AS NEEDED
Status: DISCONTINUED | OUTPATIENT
Start: 2024-02-12 | End: 2024-02-12 | Stop reason: HOSPADM

## 2024-02-12 RX ORDER — ACETAMINOPHEN 325 MG/1
650 TABLET ORAL EVERY 6 HOURS SCHEDULED
Status: DISCONTINUED | OUTPATIENT
Start: 2024-02-12 | End: 2024-02-13 | Stop reason: HOSPADM

## 2024-02-12 RX ORDER — ROPIVACAINE/EPI/CLONIDINE/KET 2.46-0.005
SYRINGE (ML) INJECTION AS NEEDED
Status: DISCONTINUED | OUTPATIENT
Start: 2024-02-12 | End: 2024-02-12 | Stop reason: HOSPADM

## 2024-02-12 RX ORDER — HYDROXYCHLOROQUINE SULFATE 200 MG/1
200 TABLET, FILM COATED ORAL DAILY
Status: DISCONTINUED | OUTPATIENT
Start: 2024-02-13 | End: 2024-02-13 | Stop reason: HOSPADM

## 2024-02-12 RX ORDER — ALLOPURINOL 100 MG/1
100 TABLET ORAL DAILY
Status: DISCONTINUED | OUTPATIENT
Start: 2024-02-12 | End: 2024-02-13 | Stop reason: HOSPADM

## 2024-02-12 RX ORDER — TRANEXAMIC ACID 650 MG/1
1950 TABLET ORAL ONCE
Status: COMPLETED | OUTPATIENT
Start: 2024-02-13 | End: 2024-02-13

## 2024-02-12 RX ORDER — CELECOXIB 200 MG/1
200 CAPSULE ORAL ONCE
Status: COMPLETED | OUTPATIENT
Start: 2024-02-12 | End: 2024-02-12

## 2024-02-12 RX ORDER — HYDRALAZINE HYDROCHLORIDE 20 MG/ML
5 INJECTION INTRAMUSCULAR; INTRAVENOUS EVERY 30 MIN PRN
Status: DISCONTINUED | OUTPATIENT
Start: 2024-02-12 | End: 2024-02-12 | Stop reason: HOSPADM

## 2024-02-12 RX ORDER — MORPHINE SULFATE 2 MG/ML
2 INJECTION, SOLUTION INTRAMUSCULAR; INTRAVENOUS EVERY 2 HOUR PRN
Status: DISCONTINUED | OUTPATIENT
Start: 2024-02-12 | End: 2024-02-13 | Stop reason: HOSPADM

## 2024-02-12 RX ORDER — DIPHENHYDRAMINE HYDROCHLORIDE 50 MG/ML
12.5 INJECTION INTRAMUSCULAR; INTRAVENOUS ONCE AS NEEDED
Status: DISCONTINUED | OUTPATIENT
Start: 2024-02-12 | End: 2024-02-12 | Stop reason: HOSPADM

## 2024-02-12 RX ORDER — CYCLOBENZAPRINE HCL 10 MG
10 TABLET ORAL 3 TIMES DAILY PRN
Status: DISCONTINUED | OUTPATIENT
Start: 2024-02-12 | End: 2024-02-13 | Stop reason: HOSPADM

## 2024-02-12 RX ORDER — ROPIVACAINE HYDROCHLORIDE 2 MG/ML
20 INJECTION, SOLUTION EPIDURAL; INFILTRATION; PERINEURAL ONCE
Status: COMPLETED | OUTPATIENT
Start: 2024-02-12 | End: 2024-02-12

## 2024-02-12 RX ORDER — BUPIVACAINE HYDROCHLORIDE 7.5 MG/ML
INJECTION, SOLUTION EPIDURAL; RETROBULBAR AS NEEDED
Status: DISCONTINUED | OUTPATIENT
Start: 2024-02-12 | End: 2024-02-12

## 2024-02-12 RX ORDER — PROPOFOL 10 MG/ML
INJECTION, EMULSION INTRAVENOUS AS NEEDED
Status: DISCONTINUED | OUTPATIENT
Start: 2024-02-12 | End: 2024-02-12

## 2024-02-12 RX ORDER — MINERAL OIL
180 ENEMA (ML) RECTAL DAILY
Status: DISCONTINUED | OUTPATIENT
Start: 2024-02-12 | End: 2024-02-13 | Stop reason: HOSPADM

## 2024-02-12 RX ORDER — LABETALOL HYDROCHLORIDE 5 MG/ML
5 INJECTION, SOLUTION INTRAVENOUS ONCE AS NEEDED
Status: DISCONTINUED | OUTPATIENT
Start: 2024-02-12 | End: 2024-02-12 | Stop reason: HOSPADM

## 2024-02-12 RX ORDER — CHOLECALCIFEROL (VITAMIN D3) 25 MCG
2000 TABLET ORAL DAILY
Status: DISCONTINUED | OUTPATIENT
Start: 2024-02-12 | End: 2024-02-13 | Stop reason: HOSPADM

## 2024-02-12 RX ORDER — ONDANSETRON 4 MG/1
4 TABLET, ORALLY DISINTEGRATING ORAL EVERY 8 HOURS PRN
Status: DISCONTINUED | OUTPATIENT
Start: 2024-02-12 | End: 2024-02-13 | Stop reason: HOSPADM

## 2024-02-12 RX ORDER — OXYCODONE HYDROCHLORIDE 5 MG/1
10 TABLET ORAL EVERY 4 HOURS PRN
Status: DISCONTINUED | OUTPATIENT
Start: 2024-02-12 | End: 2024-02-13 | Stop reason: HOSPADM

## 2024-02-12 RX ORDER — SODIUM CHLORIDE, SODIUM LACTATE, POTASSIUM CHLORIDE, CALCIUM CHLORIDE 600; 310; 30; 20 MG/100ML; MG/100ML; MG/100ML; MG/100ML
100 INJECTION, SOLUTION INTRAVENOUS CONTINUOUS
Status: DISCONTINUED | OUTPATIENT
Start: 2024-02-12 | End: 2024-02-12 | Stop reason: HOSPADM

## 2024-02-12 RX ORDER — MEPERIDINE HYDROCHLORIDE 25 MG/ML
12.5 INJECTION INTRAMUSCULAR; INTRAVENOUS; SUBCUTANEOUS EVERY 10 MIN PRN
Status: DISCONTINUED | OUTPATIENT
Start: 2024-02-12 | End: 2024-02-12 | Stop reason: HOSPADM

## 2024-02-12 RX ORDER — FAMOTIDINE 20 MG/1
20 TABLET, FILM COATED ORAL DAILY
Status: DISCONTINUED | OUTPATIENT
Start: 2024-02-12 | End: 2024-02-13 | Stop reason: HOSPADM

## 2024-02-12 RX ORDER — ALBUTEROL SULFATE 0.83 MG/ML
2.5 SOLUTION RESPIRATORY (INHALATION) ONCE AS NEEDED
Status: DISCONTINUED | OUTPATIENT
Start: 2024-02-12 | End: 2024-02-12 | Stop reason: HOSPADM

## 2024-02-12 RX ORDER — HYOSCYAMINE SULFATE 0.12 MG/1
0.12 TABLET, ORALLY DISINTEGRATING ORAL DAILY
Status: DISCONTINUED | OUTPATIENT
Start: 2024-02-12 | End: 2024-02-13 | Stop reason: HOSPADM

## 2024-02-12 RX ORDER — ONDANSETRON HYDROCHLORIDE 2 MG/ML
INJECTION, SOLUTION INTRAVENOUS AS NEEDED
Status: DISCONTINUED | OUTPATIENT
Start: 2024-02-12 | End: 2024-02-12

## 2024-02-12 RX ORDER — DOCUSATE SODIUM 100 MG/1
100 CAPSULE, LIQUID FILLED ORAL 2 TIMES DAILY
Status: DISCONTINUED | OUTPATIENT
Start: 2024-02-12 | End: 2024-02-13 | Stop reason: HOSPADM

## 2024-02-12 RX ORDER — ASPIRIN 81 MG/1
81 TABLET ORAL 2 TIMES DAILY
Status: DISCONTINUED | OUTPATIENT
Start: 2024-02-12 | End: 2024-02-13 | Stop reason: HOSPADM

## 2024-02-12 RX ORDER — ACETAMINOPHEN 325 MG/1
975 TABLET ORAL ONCE
Status: COMPLETED | OUTPATIENT
Start: 2024-02-12 | End: 2024-02-12

## 2024-02-12 RX ADMIN — CEFAZOLIN SODIUM 2 G: 2 INJECTION, SOLUTION INTRAVENOUS at 11:40

## 2024-02-12 RX ADMIN — POVIDONE-IODINE 1 APPLICATION: 5 SOLUTION TOPICAL at 09:51

## 2024-02-12 RX ADMIN — Medication 2 L/MIN: at 14:30

## 2024-02-12 RX ADMIN — TRANEXAMIC ACID 1950 MG: 650 TABLET ORAL at 09:51

## 2024-02-12 RX ADMIN — OXYCODONE HYDROCHLORIDE 10 MG: 5 TABLET ORAL at 14:42

## 2024-02-12 RX ADMIN — CEFAZOLIN SODIUM 2 G: 2 INJECTION, SOLUTION INTRAVENOUS at 19:44

## 2024-02-12 RX ADMIN — KETOROLAC TROMETHAMINE 15 MG: 30 INJECTION, SOLUTION INTRAMUSCULAR; INTRAVENOUS at 19:44

## 2024-02-12 RX ADMIN — ALLOPURINOL 100 MG: 100 TABLET ORAL at 20:00

## 2024-02-12 RX ADMIN — DEXAMETHASONE SODIUM PHOSPHATE: 10 INJECTION INTRAMUSCULAR; INTRAVENOUS at 11:08

## 2024-02-12 RX ADMIN — TRANEXAMIC ACID 1950 MG: 650 TABLET ORAL at 18:57

## 2024-02-12 RX ADMIN — ACETAMINOPHEN 975 MG: 325 TABLET ORAL at 09:51

## 2024-02-12 RX ADMIN — DOCUSATE SODIUM 100 MG: 100 CAPSULE, LIQUID FILLED ORAL at 21:00

## 2024-02-12 RX ADMIN — CELECOXIB 200 MG: 200 CAPSULE ORAL at 09:51

## 2024-02-12 RX ADMIN — ONDANSETRON 4 MG: 2 INJECTION, SOLUTION INTRAMUSCULAR; INTRAVENOUS at 12:03

## 2024-02-12 RX ADMIN — BUPIVACAINE HYDROCHLORIDE 1.8 ML: 7.5 INJECTION, SOLUTION EPIDURAL; RETROBULBAR at 11:37

## 2024-02-12 RX ADMIN — ROPIVACAINE HYDROCHLORIDE 40 MG: 2 INJECTION, SOLUTION EPIDURAL; INFILTRATION at 11:08

## 2024-02-12 RX ADMIN — ACETAMINOPHEN 650 MG: 325 TABLET ORAL at 14:25

## 2024-02-12 RX ADMIN — PROPOFOL 100 MG: 10 INJECTION, EMULSION INTRAVENOUS at 11:40

## 2024-02-12 RX ADMIN — SODIUM CHLORIDE, POTASSIUM CHLORIDE, SODIUM LACTATE AND CALCIUM CHLORIDE 50 ML/HR: 600; 310; 30; 20 INJECTION, SOLUTION INTRAVENOUS at 14:24

## 2024-02-12 RX ADMIN — SODIUM CHLORIDE, POTASSIUM CHLORIDE, SODIUM LACTATE AND CALCIUM CHLORIDE 50 ML/HR: 600; 310; 30; 20 INJECTION, SOLUTION INTRAVENOUS at 10:00

## 2024-02-12 RX ADMIN — ASPIRIN 81 MG: 81 TABLET, COATED ORAL at 21:00

## 2024-02-12 RX ADMIN — PROPOFOL 100 MCG/KG/MIN: 10 INJECTION, EMULSION INTRAVENOUS at 11:40

## 2024-02-12 RX ADMIN — KETOROLAC TROMETHAMINE 15 MG: 30 INJECTION, SOLUTION INTRAMUSCULAR; INTRAVENOUS at 14:25

## 2024-02-12 RX ADMIN — FAMOTIDINE 20 MG: 20 TABLET, FILM COATED ORAL at 18:56

## 2024-02-12 RX ADMIN — Medication 2000 UNITS: at 20:00

## 2024-02-12 RX ADMIN — OXYCODONE HYDROCHLORIDE 10 MG: 5 TABLET ORAL at 18:58

## 2024-02-12 RX ADMIN — SODIUM CHLORIDE, POTASSIUM CHLORIDE, SODIUM LACTATE AND CALCIUM CHLORIDE 50 ML/HR: 600; 310; 30; 20 INJECTION, SOLUTION INTRAVENOUS at 10:08

## 2024-02-12 RX ADMIN — ACETAMINOPHEN 650 MG: 325 TABLET ORAL at 19:44

## 2024-02-12 RX ADMIN — MIDAZOLAM 4 MG: 1 INJECTION INTRAMUSCULAR; INTRAVENOUS at 11:08

## 2024-02-12 SDOH — SOCIAL STABILITY: SOCIAL INSECURITY: DO YOU FEEL UNSAFE GOING BACK TO THE PLACE WHERE YOU ARE LIVING?: NO

## 2024-02-12 SDOH — SOCIAL STABILITY: SOCIAL INSECURITY: ARE YOU OR HAVE YOU BEEN THREATENED OR ABUSED PHYSICALLY, EMOTIONALLY, OR SEXUALLY BY ANYONE?: NO

## 2024-02-12 SDOH — SOCIAL STABILITY: SOCIAL INSECURITY: ARE THERE ANY APPARENT SIGNS OF INJURIES/BEHAVIORS THAT COULD BE RELATED TO ABUSE/NEGLECT?: NO

## 2024-02-12 SDOH — SOCIAL STABILITY: SOCIAL INSECURITY: HAS ANYONE EVER THREATENED TO HURT YOUR FAMILY OR YOUR PETS?: NO

## 2024-02-12 SDOH — SOCIAL STABILITY: SOCIAL INSECURITY: ABUSE: ADULT

## 2024-02-12 SDOH — SOCIAL STABILITY: SOCIAL INSECURITY: DOES ANYONE TRY TO KEEP YOU FROM HAVING/CONTACTING OTHER FRIENDS OR DOING THINGS OUTSIDE YOUR HOME?: NO

## 2024-02-12 SDOH — SOCIAL STABILITY: SOCIAL INSECURITY: DO YOU FEEL ANYONE HAS EXPLOITED OR TAKEN ADVANTAGE OF YOU FINANCIALLY OR OF YOUR PERSONAL PROPERTY?: NO

## 2024-02-12 SDOH — SOCIAL STABILITY: SOCIAL INSECURITY: HAVE YOU HAD THOUGHTS OF HARMING ANYONE ELSE?: NO

## 2024-02-12 ASSESSMENT — COLUMBIA-SUICIDE SEVERITY RATING SCALE - C-SSRS
1. IN THE PAST MONTH, HAVE YOU WISHED YOU WERE DEAD OR WISHED YOU COULD GO TO SLEEP AND NOT WAKE UP?: NO
2. HAVE YOU ACTUALLY HAD ANY THOUGHTS OF KILLING YOURSELF?: NO
6. HAVE YOU EVER DONE ANYTHING, STARTED TO DO ANYTHING, OR PREPARED TO DO ANYTHING TO END YOUR LIFE?: NO

## 2024-02-12 ASSESSMENT — LIFESTYLE VARIABLES
HAS A RELATIVE, FRIEND, DOCTOR, OR ANOTHER HEALTH PROFESSIONAL EXPRESSED CONCERN ABOUT YOUR DRINKING OR SUGGESTED YOU CUT DOWN: NO
AUDIT TOTAL SCORE: 0
HOW OFTEN DO YOU HAVE 6 OR MORE DRINKS ON ONE OCCASION: NEVER
HOW OFTEN DO YOU HAVE A DRINK CONTAINING ALCOHOL: 4 OR MORE TIMES A WEEK
HOW OFTEN DURING THE LAST YEAR HAVE YOU FAILED TO DO WHAT WAS NORMALLY EXPECTED FROM YOU BECAUSE OF DRINKING: NEVER
SKIP TO QUESTIONS 9-10: 1
HAVE YOU OR SOMEONE ELSE BEEN INJURED AS A RESULT OF YOUR DRINKING: NO
PRESCIPTION_ABUSE_PAST_12_MONTHS: NO
SUBSTANCE_ABUSE_PAST_12_MONTHS: NO
AUDIT-C TOTAL SCORE: 4
HOW OFTEN DURING THE LAST YEAR HAVE YOU NEEDED AN ALCOHOLIC DRINK FIRST THING IN THE MORNING TO GET YOURSELF GOING AFTER A NIGHT OF HEAVY DRINKING: NEVER
HOW OFTEN DURING THE LAST YEAR HAVE YOU FOUND THAT YOU WERE NOT ABLE TO STOP DRINKING ONCE YOU HAD STARTED: NEVER
AUDIT-C TOTAL SCORE: 4
HOW OFTEN DURING THE LAST YEAR HAVE YOU BEEN UNABLE TO REMEMBER WHAT HAPPENED THE NIGHT BEFORE BECAUSE YOU HAD BEEN DRINKING: NEVER
HOW OFTEN DURING THE LAST YEAR HAVE YOU HAD A FEELING OF GUILT OR REMORSE AFTER DRINKING: NEVER
AUDIT TOTAL SCORE: 4
HOW MANY STANDARD DRINKS CONTAINING ALCOHOL DO YOU HAVE ON A TYPICAL DAY: 1 OR 2

## 2024-02-12 ASSESSMENT — ACTIVITIES OF DAILY LIVING (ADL)
DRESSING YOURSELF: INDEPENDENT
WALKS IN HOME: INDEPENDENT
LACK_OF_TRANSPORTATION: NO
GROOMING: INDEPENDENT
HEARING - LEFT EAR: FUNCTIONAL
JUDGMENT_ADEQUATE_SAFELY_COMPLETE_DAILY_ACTIVITIES: YES
ASSISTIVE_DEVICE: WALKER;HEARING AID - RIGHT
BATHING: INDEPENDENT
HEARING - RIGHT EAR: FUNCTIONAL
ADEQUATE_TO_COMPLETE_ADL: YES
FEEDING YOURSELF: INDEPENDENT
LACK_OF_TRANSPORTATION: NO
PATIENT'S MEMORY ADEQUATE TO SAFELY COMPLETE DAILY ACTIVITIES?: YES
TOILETING: INDEPENDENT

## 2024-02-12 ASSESSMENT — COGNITIVE AND FUNCTIONAL STATUS - GENERAL
MOVING TO AND FROM BED TO CHAIR: A LITTLE
WALKING IN HOSPITAL ROOM: A LITTLE
TURNING FROM BACK TO SIDE WHILE IN FLAT BAD: A LITTLE
CLIMB 3 TO 5 STEPS WITH RAILING: TOTAL
MOVING FROM LYING ON BACK TO SITTING ON SIDE OF FLAT BED WITH BEDRAILS: A LITTLE
MOBILITY SCORE: 22
CLIMB 3 TO 5 STEPS WITH RAILING: TOTAL
WALKING IN HOSPITAL ROOM: A LITTLE
STANDING UP FROM CHAIR USING ARMS: A LOT
CLIMB 3 TO 5 STEPS WITH RAILING: A LITTLE
TURNING FROM BACK TO SIDE WHILE IN FLAT BAD: A LITTLE
MOBILITY SCORE: 15
DAILY ACTIVITIY SCORE: 24
PATIENT BASELINE BEDBOUND: NO
MOVING TO AND FROM BED TO CHAIR: A LITTLE
STANDING UP FROM CHAIR USING ARMS: A LOT
WALKING IN HOSPITAL ROOM: A LITTLE
DAILY ACTIVITIY SCORE: 24
MOVING FROM LYING ON BACK TO SITTING ON SIDE OF FLAT BED WITH BEDRAILS: A LITTLE
MOBILITY SCORE: 15

## 2024-02-12 ASSESSMENT — PAIN SCALES - GENERAL
PAINLEVEL_OUTOF10: 0 - NO PAIN
PAINLEVEL_OUTOF10: 6
PAINLEVEL_OUTOF10: 0 - NO PAIN
PAINLEVEL_OUTOF10: 0 - NO PAIN
PAINLEVEL_OUTOF10: 6
PAINLEVEL_OUTOF10: 0 - NO PAIN
PAINLEVEL_OUTOF10: 0 - NO PAIN
PAINLEVEL_OUTOF10: 2
PAINLEVEL_OUTOF10: 0 - NO PAIN

## 2024-02-12 ASSESSMENT — PAIN - FUNCTIONAL ASSESSMENT
PAIN_FUNCTIONAL_ASSESSMENT: 0-10

## 2024-02-12 ASSESSMENT — PATIENT HEALTH QUESTIONNAIRE - PHQ9
1. LITTLE INTEREST OR PLEASURE IN DOING THINGS: NOT AT ALL
SUM OF ALL RESPONSES TO PHQ9 QUESTIONS 1 & 2: 0
2. FEELING DOWN, DEPRESSED OR HOPELESS: NOT AT ALL

## 2024-02-12 NOTE — CARE PLAN
The patient's goals for the shift include Up with PT    The clinical goals for the shift include Up with PT and pain control

## 2024-02-12 NOTE — ANESTHESIA PROCEDURE NOTES
Spinal Block    Patient location during procedure: OR  Start time: 2/12/2024 11:27 AM  End time: 2/12/2024 11:37 AM  Reason for block: primary anesthetic and at surgeon's request  Staffing  Performed: attending   Authorized by: Braeden Ramos DO    Performed by: Braeden Ramos DO    Preanesthetic Checklist  Completed: patient identified, IV checked, risks and benefits discussed, surgical consent, monitors and equipment checked, pre-op evaluation, timeout performed and sterile techniques followed  Block Timeout  RN/Licensed healthcare professional reads aloud to the Anesthesia provider and entire team: Patient identity, procedure with side and site, patient position, and as applicable the availability of implants/special equipment/special requirements.  Patient on coagulant treatment: no  Timeout performed at: 2/12/2024 11:22 AM  Spinal Block  Patient position: sitting  Prep: Betadine  Sterility prep: cap, drape, gloves and hand hygiene  Sedation level: light sedation  Patient monitoring: blood pressure, continuous pulse oximetry and heart rate  Approach: midline  Vertebral space: L3-4  Injection technique: single-shot  Needle  Needle type: Quincke   Needle gauge: 22 G  Needle length: 3.5 in  Free flowing CSF: yes    Assessment  Sensory level: T10 bilateral  Block outcome: Allis test negative  Procedure assessment: patient sedated but conversant throughout procedure and patient tolerated procedure well with no immediate complications  Additional Notes  Spinal. Sitting position. Sterile prep. L3-L4 level approached 2 cm left of anticipated midline due to scoliotic changes of lumbar spine. A 22g Quincke needles was utilized and successful intrathecal injection of 1.8 mL of hyperbaric 0.75% bupivacaine was administered. 2 attempts as level below was attempted but unsuccessful. Positive CSF flow and swirl, no heme. Single episode of right sided paresthesia that improved after needle was repositioned. Patient tolerated  procedure well without complication

## 2024-02-12 NOTE — PROGRESS NOTES
Physical Therapy    Physical Therapy Evaluation & Treatment    Patient Name: Vinicius Gallagher  MRN: [id number]  Today's Date: 2/12/2024   Time Calculation  Start Time: 1550  Stop Time: 1630  Time Calculation (min): 40 min    Assessment/Plan   PT Assessment  PT Assessment Results: Decreased strength, Decreased range of motion, Decreased endurance, Impaired balance, Decreased mobility  Rehab Prognosis: Good  Evaluation/Treatment Tolerance: Patient tolerated treatment well  Medical Staff Made Aware: Yes  Strengths: Ability to acquire knowledge, Attitude of self, Living arrangement secure, Support of Caregivers, Access to adaptive/assistive products, Coping skills, Housing layout  End of Session Communication: Bedside nurse, PCT/NA/CTA  End of Session Patient Position: Up in chair, Alarm on  IP OR SWING BED PT PLAN  Inpatient or Swing Bed: Inpatient  PT Plan  Treatment/Interventions: Bed mobility, Transfer training, Gait training, Stair training, Strengthening, Range of motion, Therapeutic exercise, Home exercise program  PT Plan: Skilled PT  PT Frequency: BID  PT Discharge Recommendations: Low intensity level of continued care  Equipment Recommended upon Discharge: Straight cane  PT Recommended Transfer Status: Assist x1, Assistive device  Physical Therapy eval completed per MD requisition. P.T. recommendations as outlined above. Recommend D/C from acute care when medically appropriate as deemed by medical staff.      Subjective     General Visit Information:  General  Reason for Referral: L TKA  Referred By: Dr. De Luna  Past Medical History Relevant to Rehab: includes: MUSTAPHA, OA, SSS gout, renal calculi, [place], rheumatic fever  Prior to Session Communication: Bedside nurse  Patient Position Received: Bed, 3 rail up, Alarm off, not on at start of session  Preferred Learning Style: auditory, verbal, written  General Comment: Pt. is a 65yo who presented to [facility] on 2/12/2024 for a scheduled L TKA by .    Home  Living:  Home Living  Home Living Comments: Pt. lives with spouse in a 1 level house with 3 [address] without HR. Bed/bath on 1st floor with walkin shower with a seat but no grab bars. Laundry on 1st floor.    Prior Level of Function:  Prior Function Per Pt/Caregiver Report  Prior Function Comments: Pt. amb without AD PTA but owns a a FWW. Pt. does not own a cane. Pt. was I with ADLs and shared IADLs with spouse PTA. Pt. denied falls in last 3 months. Pt. drove PTA.    Precautions:  Precautions  LE Weight Bearing Status:  (FWB)  Medical Precautions:  (Activity order: OOB with A)  Precautions Comment: Knee immobilizer in place L LE until Pt. can complete SLR.         Objective     Pain:  Pain Assessment  Pain Assessment: 0-10  Pain Score: 0 - No pain  Pain Interventions: Cold applied, Repositioned    Cognition:  Cognition  Overall Cognitive Status: Within Functional Limits    General Assessments:  General Observation  General Observation: IV                     Dynamic Sitting Balance  Dynamic Sitting-Comments: Good static and dynamic standing balance  Dynamic Standing Balance  Dynamic Standing-Comments: Fair+ static and dynamic standing balance    Functional Assessments:     Bed Mobility  Bed Mobility: Yes  Bed Mobility 1  Bed Mobility 1: Supine to sitting  Level of Assistance 1: Minimum assistance  Bed Mobility Comments 1: A to maneuver L LE over EOB. VC's to use B UEs to elevate trunk and scoot hips to EOB.  Transfers  Transfer: Yes  Transfer 1  Technique 1: Sit to stand  Transfer Device 1: Gait belt (FWW)  Transfer Level of Assistance 1: Moderate assistance  Trials/Comments 1: Pt. required A to for lifting, transferring weight forward over feet, steadying.VC's for hand placement. Knee immobilizer on R LE.  Transfers 2  Technique 2: Stand to sit  Transfer Device 2: Gait belt (FWW)  Transfer Level of Assistance 2: Minimum assistance  Trials/Comments 2: Pt. required A to control descent. VC's for hand placement. Knee  immobilizer on R LE.  Transfers 3  Technique 3: Stand pivot  Transfer Device 3: Gait belt (FWW)  Transfer Level of Assistance 3: Minimum assistance  Trials/Comments 3: Pt. completed stand pivot transfers with FWW with A for balance, weight shift, safety. VC's for sequence, walker placement. Knee immobilizer on R LE.  Ambulation/Gait Training  Ambulation/Gait Training Performed: Yes  Ambulation/Gait Training 1  Surface 1: Level tile  Device 1: Rolling walker  Gait Support Devices: Gait belt  Assistance 1: Minimum assistance  Quality of Gait 1: Narrow base of support (slow, step-to gait with shortened step lengths.)  Comments/Distance (ft) 1: 8'; Pt. amb with A for balance, safety, weight shift. VC's for sequence, walker placement. Pt. amb with knee immobilizer on L LE.  Stairs  Stairs: No       Extremity/Trunk Assessments:        RLE   RLE : Within Functional Limits  LLE   LLE : Exceptions to WFL  AROM LLE (degrees)  LLE AROM Comment: Hip and ankle WFL, knee -10 to 60 degrees, supine  Strength LLE  LLE Overall Strength:  (Quad 3+/5, ankle DF 4/5)    Treatments:  Therapeutic Exercise  Therapeutic Exercise Performed: Yes  Therapeutic Exercise Activity 1: Supine B LE ther ex 1 x 15: QS, AP, GS, Hip ABD, HS, SAQ    Pt. was unable to complete a SLR with L LE          Ambulation/Gait Training  Ambulation/Gait Training Performed: Yes  Ambulation/Gait Training 2  Surface 2: Level tile  Device 2: Rolling walker  Gait Support Devices: Gait belt  Assistance 2: Minimum assistance  Quality of Gait 2: Narrow base of support (slow, step-to gait with shortened step lengths.)  Comments/Distance (ft) 2: 10' Pt. required A for balance, weight shift, safety. VC's for sequence and walker placement. Knee immobilizer in place on L LE.        Outcome Measures:  [facility] Basic Mobility  Turning from your back to your side while in a flat bed without using bedrails: A little  Moving from lying on your back to sitting on the side of a flat bed  without using bedrails: A little  Moving to and from bed to chair (including a wheelchair): A little  Standing up from a chair using your arms (e.g. wheelchair or bedside chair): A lot  To walk in hospital room: A little  Climbing 3-5 steps with railing: Total  Basic Mobility - Total Score: 15                            Goals:  Encounter Problems       Encounter Problems (Active)       PT Problem       Pt. will transfer supine/sit with MOD I (Progressing)       Start:  02/12/24    Expected End:  02/15/24            Pt. will complete sit/stand and  stand pivot transfers with FWW with MOD I (Progressing)       Start:  02/12/24    Expected End:  02/15/24            Pt.will ambulate 125' with FWW with MOD I (Progressing)       Start:  02/12/24    Expected End:  02/15/24            Pt. will amb up/down 3 steps with cane with CGA  (Not Progressing)       Start:  02/12/24    Expected End:  02/15/24            Pt. will demonstrate 0-90 degrees AROM L knee  (Progressing)       Start:  02/12/24    Expected End:  02/15/24            Pt. will complete SLR x 10 reps with L LE  (Progressing)       Start:  02/12/24    Expected End:  02/15/24               Pain - Adult            Education Documentation  Home Exercise Program, taught by Henry Hui, PT at 2/12/2024  5:35 PM.  Learner: Significant Other, Patient  Readiness: Acceptance  Method: Explanation  Response: Verbalizes Understanding, Needs Reinforcement  Comment: Role of PT, transfers, amb, safety, PT POC, ther ex    Mobility Training, taught by Henry Hui, PT at 2/12/2024  5:35 PM.  Learner: Significant Other, Patient  Readiness: Acceptance  Method: Explanation  Response: Verbalizes Understanding, Needs Reinforcement  Comment: Role of PT, transfers, amb, safety, PT POC, ther ex

## 2024-02-12 NOTE — H&P
History and physical is reviewed, patient re evaluated, no changes.  Procedure, risks, benefits, and postoperative course were discussed with the patient and consent is reviewed.    Antonio De Luna MD

## 2024-02-12 NOTE — ANESTHESIA POSTPROCEDURE EVALUATION
Patient: Vinicius Gallagher    Procedure Summary       Date: 02/12/24 Room / Location: [facility] 11 / Virtual [facility]    Anesthesia Start: 1122 Anesthesia Stop: 1303    Procedure: Arthroplasty Total Knee Robot-Assisted (Left: Knee) Diagnosis:       Unilateral primary osteoarthritis, left knee      (Unilateral primary osteoarthritis, left knee [M17.12])    Surgeons: Antonio De Luna MD Responsible Provider: Braeden Ramos DO    Anesthesia Type: regional, spinal, MAC ASA Status: 2            Anesthesia Type: regional, spinal, MAC    Vitals Value Taken Time   BP 96/53 02/12/24 1300   Temp 37 °C (98.6 °F) 02/12/24 1300   Pulse 74 02/12/24 1302   Resp 22 02/12/24 1302   SpO2 95 % 02/12/24 1302   Vitals shown include unvalidated device data.    Anesthesia Post Evaluation    Patient location during evaluation: PACU  Patient participation: complete - patient participated  Level of consciousness: awake  Pain management: adequate  Airway patency: patent  Cardiovascular status: acceptable, blood pressure returned to baseline and hemodynamically stable  Respiratory status: acceptable, nonlabored ventilation, spontaneous ventilation and face mask  Hydration status: acceptable  Postoperative Nausea and Vomiting: none        No notable events documented.

## 2024-02-12 NOTE — ANESTHESIA PROCEDURE NOTES
Peripheral Block    Patient location during procedure: pre-op  Start time: 2/12/2024 11:08 AM  End time: 2/12/2024 11:18 AM  Reason for block: at surgeon's request and post-op pain management  Staffing  Performed: attending   Authorized by: Braeden Ramos DO    Performed by: Braeden Ramos DO  Preanesthetic Checklist  Completed: patient identified, IV checked, site marked, risks and benefits discussed, surgical consent, monitors and equipment checked, pre-op evaluation and timeout performed   Timeout performed at: 2/12/2024 11:08 AM  Peripheral Block  Patient position: laying flat  Prep: ChloraPrep  Patient monitoring: heart rate, cardiac monitor and continuous pulse ox  Block type: adductor canal (+IPACK)  Laterality: left  Injection technique: single-shot  Guidance: ultrasound guided  Local infiltration: lidocaine  Infiltration strength: 1 %  Dose: 5 mL  Needle  Needle gauge: 22 G  Needle length: 10 cm  Needle localization: anatomical landmarks and ultrasound guidance  Assessment  Injection assessment: negative aspiration for heme, no paresthesia on injection, incremental injection and local visualized surrounding nerve on ultrasound  Paresthesia pain: none  Heart rate change: no  Slow fractionated injection: yes  Additional Notes  Adductor canal and IPACK nerve blocks performed for post-operative analgesia. 4 mg IV versed given for procedural sedation. Sterile prep. 20 mL of 0.5% ropivacaine with decadron 5 mg given in divided doses for the adductor canal. 20 mL of 0.2% ropivacaine given for the IPACK block. Negative aspiration for heme every 5 mL injected. No pain, paresthesias. Patient tolerated the procedure well without complications.

## 2024-02-12 NOTE — OP NOTE
Arthroplasty Total Knee Robot-Assisted (L) Operative Note     Date: 2024  OR Location: [facility]    Name: Vinicius Gallagher : 1960, Age: 64 y.o., MRN: [id number], Sex: male    Diagnosis  Pre-op Diagnosis     * Unilateral primary osteoarthritis, left knee [M17.12] Post-op Diagnosis     * Unilateral primary osteoarthritis, left knee [M17.12]     Procedures  Arthroplasty Total Knee Robot-Assisted  42182 - CO ARTHRP KNE CONDYLE&PLATU MEDIAL&LAT COMPARTMENTS      Surgeons      * Antonio De Luna - Primary    Resident/Fellow/Other Assistant:  Surgeon(s) and Role:     * Shannon Case PA-C - Assisting    Procedure Summary  Anesthesia: Spinal  ASA: II  Anesthesia Staff: Anesthesiologist: Braeden Ramos DO  Estimated Blood Loss: minmL  Intra-op Medications:   Administrations occurring from 1145 to 1245 on 24:   Medication Name Total Dose   ropivacaine-epinephrine-clonidine-ketorolac 2.46-0.005- 0.0008-0.3mg/mL periarticular syringe 100 mL   sodium chloride 0.9 % irrigation solution 1,000 mL   sterile water irrigation solution 1,000 mL              Anesthesia Record               Intraprocedure I/O Totals          Intake    Propofol Drip 0.00 mL    The total shown is the total volume documented since Anesthesia Start was filed.    ceFAZolin in dextrose (iso-os) (Ancef) IVPB 2 g 100.00 mL    Total Intake 100 mL          Specimen: No specimens collected     Staff:   Circulator: Yon Whatley RN  Relief Circulator: Alvina Celis RN  Scrub Person: Jessica Chino         Drains and/or Catheters: * None in log *    Tourniquet Times:   * Missing tourniquet times found for documented tourniquets in lo *     Implants:  Implants       Type Name Action Serial No.      Implant CEMENT, BONE, SIMPLEX HV FULL DOSE  40 GM - [id number] Implanted      Implant CEMENT, BONE, SIMPLEX HV FULL DOSE  40 GM - [id number] Implanted      Joint FEM COMP, TRIATH CR SZ6 LEFT - [id number] Implanted      Joint PLATE, TIBIAL  TRIATH DALTON CINDY FXD BPLT P6 - [id number] Implanted      Joint INSERT, TIBIAL, X3 TRIATHLON CS, SZ-6 9MM - [id number] Implanted      Joint PATELLA,  ASYMM TRIATH 32MM x 10MM - [id number] Implanted               Findings: see procedure details    Indications: Vinicius Gallagher is an 64 y.o. male who is having surgery for Unilateral primary osteoarthritis, left knee [M17.12].     The patient was seen in the preoperative area. The risks, benefits, complications, treatment options, non-operative alternatives, expected recovery and outcomes were discussed with the patient. The possibilities of reaction to medication, pulmonary aspiration, injury to surrounding structures, bleeding, recurrent infection, the need for additional procedures, failure to diagnose a condition, and creating a complication requiring transfusion or operation were discussed with the patient. The patient concurred with the proposed plan, giving informed consent.  The site of surgery was properly noted/marked if necessary per policy. The patient has been actively warmed in preoperative area. Preoperative antibiotics have been ordered and given within 1 hours of incision. Venous thrombosis prophylaxis have been ordered.    Procedure Details:   Preop diagnosis is DJD left knee  Postoperative diagnosis is same  Procedure total knee replacement using the IPexpert computer assisted robotic-assisted knee replacement system    Anesthesia spinal with nerve block  EBL minimal    Implants [place]  Tibial component size6  Femoral component size6 CR  Patella size32  Insert size9 CS    Primary surgeon Antonio De Luna  Assisting surgeon Shannon HUYNH certified      Pre-operative alignment 1 degree flexion contracture 7 degrees varus  Post-operative alignment 2 degree flexion contracture 1.5 degrees varus      This patient has progressive knee pain which has been refractory to conservative treatment.  The patient has decided to undergo elective total knee replacement.   The risks, benefits, outcomes and postoperative course were fully explained to the patient.  We discussed wear, loosening, blood clot infection, nerve damage, numbness and tingling, failure of the procedure, stiffness, loss of life, loss of limb, and the need for possible revision surgery.  The patient understands the procedure and consents to surgical intervention.    The patient has pain in the knee that is increased with activity and weightbearing, and walks with an antalgic gait.  The pain is interfering with activities of daily living.  The patient has limited range of motion and pain with passive range of motion.  X-rays demonstrate joint space narrowing, subchondral sclerosis and osteophyte formation.  Symptoms are not improving with medication, therapy or supportive device for a period of at least 3 months.    The physician assistant was present through the entire case.  Given the nature of the disease process and the procedure to be performed skilled surgical first assistant was necessary during the case.  The assistant was necessary to hold retractors and manipulate the extremity during the procedure.  A certified scrub tech was at the back table managing the instruments and supplies for the surgical case.    Patient was taken to the operating room placed on the table in the supine position where upon adequate anesthesia was obtained  A tourniquet was applied to the lower extremity  The patient was then prepped and draped in the usual sterile manner the leg was then exsanguinated using an Esmarch bandage and the tourniquet was inflated to 250 mmHg  A surgical timeout was performed  A linear midline incision was made through the skin and subcutaneous tissues using sharp and blunt dissection.  A medial parapatellar arthrotomy was then performed and the patella was everted.  The patellar fat pad was then excised.  The femoral array and femoral checkpoint were then inserted in standard technique.  the tibial  array was placed in the tibial checkpoints was applied.  Hip center was then identified by rotating the lower extremity.  Medial and lateral malleolar checkpoints performed.  At this point femoral mapping was performed using the blue probe and accuracy was confirmed and verified.  Tibial mapping was then performed with the patella was well and accuracy was confirmed.  Pose capture was then performed both extension and flexion.  Flexion and extension gaps were assessed and adjusted appropriately.  Once we confirmed flexion and extension gaps the cut sequence was verified and confirmed.  The robot was then introduced into the field the sawblade was verified self-retaining retractors were placed and the femoral cuts were then performed.  Tibial cut was completed as well and all bony surfaces were removed without difficulty meniscal remnants were excised  Tibial trial was then placed on the tibial bone surface to assess for appropriate sizing.  Tibial component rotation was confirmed using the green probe.   The femoral trial was applied as was the polyethylene insert.  The knee was placed in flexion and extension,  stability was assessed throughout using the robotic system as well as manual tension.  Patellar reaming was then performed and peg holes were drilled for the patella and the patella trial was applied.  Patellofemoral tracking was assessed and once we confirmed stability and patellofemoral tracking the tibial component was pinned in position.  The remaining trials were removed and the tibial bone surface was completed using the cruciate punch all bony surfaces were then irrigated with a copious amount of pulsatile irrigation.  Cement was mixed on the back table using vacuum technique  Components were then inserted and completely seated without difficulty  A Carbon elevator was used to remove any remaining cement and the knee was placed in full extension while the cement hardened  The knee joint was then  irrigated with a copious pulsatile irrigation.  Joint capsule was repaired using interrupted #1 Vicryl suture.  3-0 Monocryl suture was used for the underlying subcutaneous tissue and 4-0 Monocryl for the skin.  A dry sterile bulky dressing was applied checkpoints and array were removed prior to closure  Patient was taken to the postanesthesia care unit in good condition postoperative x-rays were reviewed and findings the procedure were discussed with the patient's family    This note was prepared using voice recognition software.  The details of this note are correct and have been reviewed, and corrected to the best of my ability.  Some grammatical areas may persist related to the Dragon software    Antonio De Luna MD    [phone]      Complications:  None; patient tolerated the procedure well.    Disposition: PACU - hemodynamically stable.  Condition: stable         Antonio De Luna  Phone Number: [phone]

## 2024-02-12 NOTE — SIGNIFICANT EVENT
Medicine -   Please note that patient is Dr. Viera PCP   Medicine will sign off at this time   Contact if needed      02/12/24 at 2:47 PM - VIKTORIYA Becerril-CNP    
no

## 2024-02-13 ENCOUNTER — DOCUMENTATION (OUTPATIENT)
Dept: HOME HEALTH SERVICES | Facility: HOME HEALTH | Age: 64
End: 2024-02-13
Payer: MEDICARE

## 2024-02-13 ENCOUNTER — HOME HEALTH ADMISSION (OUTPATIENT)
Dept: HOME HEALTH SERVICES | Facility: HOME HEALTH | Age: 64
End: 2024-02-13
Payer: MEDICARE

## 2024-02-13 ENCOUNTER — PHARMACY VISIT (OUTPATIENT)
Dept: PHARMACY | Facility: CLINIC | Age: 64
End: 2024-02-13
Payer: COMMERCIAL

## 2024-02-13 VITALS
RESPIRATION RATE: 18 BRPM | HEIGHT: 71 IN | HEART RATE: 56 BPM | BODY MASS INDEX: 31.67 KG/M2 | TEMPERATURE: 97.5 F | SYSTOLIC BLOOD PRESSURE: 117 MMHG | WEIGHT: 226.19 LBS | DIASTOLIC BLOOD PRESSURE: 72 MMHG | OXYGEN SATURATION: 96 %

## 2024-02-13 PROBLEM — M17.12 UNILATERAL PRIMARY OSTEOARTHRITIS, LEFT KNEE: Status: RESOLVED | Noted: 2024-01-18 | Resolved: 2024-02-13

## 2024-02-13 LAB
ANION GAP SERPL CALC-SCNC: 12 MMOL/L (ref 10–20)
BUN SERPL-MCNC: 28 MG/DL (ref 6–23)
CALCIUM SERPL-MCNC: 8.5 MG/DL (ref 8.6–10.3)
CHLORIDE SERPL-SCNC: 106 MMOL/L (ref 98–107)
CHOLEST SERPL-MCNC: 140 MG/DL (ref 0–199)
CHOLESTEROL/HDL RATIO: 2.4
CO2 SERPL-SCNC: 26 MMOL/L (ref 21–32)
CREAT SERPL-MCNC: 1.28 MG/DL (ref 0.5–1.3)
EGFRCR SERPLBLD CKD-EPI 2021: 62 ML/MIN/1.73M*2
ERYTHROCYTE [DISTWIDTH] IN BLOOD BY AUTOMATED COUNT: 13.2 % (ref 11.5–14.5)
EST. AVERAGE GLUCOSE BLD GHB EST-MCNC: 105 MG/DL
GLUCOSE SERPL-MCNC: 114 MG/DL (ref 74–99)
HBA1C MFR BLD: 5.3 %
HCT VFR BLD AUTO: 36.4 % (ref 41–52)
HDLC SERPL-MCNC: 59.5 MG/DL
HGB BLD-MCNC: 12.1 G/DL (ref 13.5–17.5)
IRON SATN MFR SERPL: 17 %
IRON SERPL-MCNC: 41 UG/DL (ref 35–150)
LDLC SERPL CALC-MCNC: 69 MG/DL
MCH RBC QN AUTO: 31.5 PG (ref 26–34)
MCHC RBC AUTO-ENTMCNC: 33.2 G/DL (ref 32–36)
MCV RBC AUTO: 95 FL (ref 80–100)
NON HDL CHOLESTEROL: 81 MG/DL (ref 0–149)
NRBC BLD-RTO: 0 /100 WBCS (ref 0–0)
PLATELET # BLD AUTO: 135 X10*3/UL (ref 150–450)
POTASSIUM SERPL-SCNC: 4.6 MMOL/L (ref 3.5–5.3)
RBC # BLD AUTO: 3.84 X10*6/UL (ref 4.5–5.9)
SODIUM SERPL-SCNC: 139 MMOL/L (ref 136–145)
TIBC SERPL-MCNC: 241 UG/DL
TRIGL SERPL-MCNC: 60 MG/DL (ref 0–149)
TSH SERPL-ACNC: 0.63 MIU/L (ref 0.44–3.98)
UIBC SERPL-MCNC: 200 UG/DL (ref 110–370)
URATE SERPL-MCNC: 5.3 MG/DL (ref 4–7.5)
VLDL: 12 MG/DL (ref 0–40)
WBC # BLD AUTO: 15.5 X10*3/UL (ref 4.4–11.3)

## 2024-02-13 PROCEDURE — 80048 BASIC METABOLIC PNL TOTAL CA: CPT | Performed by: ORTHOPAEDIC SURGERY

## 2024-02-13 PROCEDURE — 2500000002 HC RX 250 W HCPCS SELF ADMINISTERED DRUGS (ALT 637 FOR MEDICARE OP, ALT 636 FOR OP/ED): Mod: MUE | Performed by: ORTHOPAEDIC SURGERY

## 2024-02-13 PROCEDURE — 83036 HEMOGLOBIN GLYCOSYLATED A1C: CPT | Mod: ELYLAB | Performed by: INTERNAL MEDICINE

## 2024-02-13 PROCEDURE — 2500000004 HC RX 250 GENERAL PHARMACY W/ HCPCS (ALT 636 FOR OP/ED): Performed by: ORTHOPAEDIC SURGERY

## 2024-02-13 PROCEDURE — 2500000001 HC RX 250 WO HCPCS SELF ADMINISTERED DRUGS (ALT 637 FOR MEDICARE OP): Performed by: INTERNAL MEDICINE

## 2024-02-13 PROCEDURE — 84550 ASSAY OF BLOOD/URIC ACID: CPT | Performed by: INTERNAL MEDICINE

## 2024-02-13 PROCEDURE — 2500000005 HC RX 250 GENERAL PHARMACY W/O HCPCS: Performed by: INTERNAL MEDICINE

## 2024-02-13 PROCEDURE — 2500000001 HC RX 250 WO HCPCS SELF ADMINISTERED DRUGS (ALT 637 FOR MEDICARE OP): Performed by: REGISTERED NURSE

## 2024-02-13 PROCEDURE — 97110 THERAPEUTIC EXERCISES: CPT | Mod: GP,CQ

## 2024-02-13 PROCEDURE — 83540 ASSAY OF IRON: CPT | Performed by: INTERNAL MEDICINE

## 2024-02-13 PROCEDURE — 2500000001 HC RX 250 WO HCPCS SELF ADMINISTERED DRUGS (ALT 637 FOR MEDICARE OP): Performed by: ORTHOPAEDIC SURGERY

## 2024-02-13 PROCEDURE — RXMED WILLOW AMBULATORY MEDICATION CHARGE

## 2024-02-13 PROCEDURE — 97116 GAIT TRAINING THERAPY: CPT | Mod: GP,CQ

## 2024-02-13 PROCEDURE — 97530 THERAPEUTIC ACTIVITIES: CPT | Mod: GP,CQ

## 2024-02-13 PROCEDURE — 85027 COMPLETE CBC AUTOMATED: CPT | Performed by: ORTHOPAEDIC SURGERY

## 2024-02-13 PROCEDURE — 7100000011 HC EXTENDED STAY RECOVERY HOURLY - NURSING UNIT

## 2024-02-13 PROCEDURE — 36415 COLL VENOUS BLD VENIPUNCTURE: CPT | Performed by: ORTHOPAEDIC SURGERY

## 2024-02-13 PROCEDURE — 97165 OT EVAL LOW COMPLEX 30 MIN: CPT | Mod: GO

## 2024-02-13 PROCEDURE — 84443 ASSAY THYROID STIM HORMONE: CPT | Performed by: INTERNAL MEDICINE

## 2024-02-13 PROCEDURE — 80061 LIPID PANEL: CPT | Performed by: INTERNAL MEDICINE

## 2024-02-13 RX ORDER — ASPIRIN 81 MG/1
81 TABLET ORAL 2 TIMES DAILY
Qty: 60 TABLET | Refills: 0 | Status: SHIPPED | OUTPATIENT
Start: 2024-02-13 | End: 2024-03-14

## 2024-02-13 RX ORDER — DOCUSATE SODIUM 100 MG/1
100 CAPSULE, LIQUID FILLED ORAL 2 TIMES DAILY
Qty: 20 CAPSULE | Refills: 0 | Status: SHIPPED | OUTPATIENT
Start: 2024-02-13 | End: 2024-02-23

## 2024-02-13 RX ORDER — OXYCODONE HYDROCHLORIDE 5 MG/1
5 TABLET ORAL EVERY 6 HOURS PRN
Qty: 28 TABLET | Refills: 0 | Status: SHIPPED | OUTPATIENT
Start: 2024-02-13 | End: 2024-02-20

## 2024-02-13 RX ORDER — CYCLOBENZAPRINE HCL 10 MG
10 TABLET ORAL 3 TIMES DAILY PRN
Qty: 21 TABLET | Refills: 0 | Status: SHIPPED | OUTPATIENT
Start: 2024-02-13 | End: 2024-02-28 | Stop reason: SDUPTHER

## 2024-02-13 RX ADMIN — CEFAZOLIN SODIUM 2 G: 2 INJECTION, SOLUTION INTRAVENOUS at 03:39

## 2024-02-13 RX ADMIN — OXYCODONE HYDROCHLORIDE 10 MG: 5 TABLET ORAL at 12:20

## 2024-02-13 RX ADMIN — KETOROLAC TROMETHAMINE 15 MG: 30 INJECTION, SOLUTION INTRAMUSCULAR; INTRAVENOUS at 08:20

## 2024-02-13 RX ADMIN — HYOSCYAMINE SULFATE 0.12 MG: 0.12 TABLET SUBLINGUAL at 08:19

## 2024-02-13 RX ADMIN — Medication 2000 UNITS: at 08:19

## 2024-02-13 RX ADMIN — ALLOPURINOL 100 MG: 100 TABLET ORAL at 08:20

## 2024-02-13 RX ADMIN — TRANEXAMIC ACID 1950 MG: 650 TABLET ORAL at 05:18

## 2024-02-13 RX ADMIN — ACETAMINOPHEN 650 MG: 325 TABLET ORAL at 11:36

## 2024-02-13 RX ADMIN — FAMOTIDINE 20 MG: 20 TABLET, FILM COATED ORAL at 08:20

## 2024-02-13 RX ADMIN — OXYCODONE HYDROCHLORIDE 10 MG: 5 TABLET ORAL at 08:20

## 2024-02-13 RX ADMIN — ASPIRIN 81 MG: 81 TABLET, COATED ORAL at 08:20

## 2024-02-13 RX ADMIN — HYDROXYCHLOROQUINE SULFATE 200 MG: 200 TABLET, FILM COATED ORAL at 08:19

## 2024-02-13 RX ADMIN — DOCUSATE SODIUM 100 MG: 100 CAPSULE, LIQUID FILLED ORAL at 08:20

## 2024-02-13 RX ADMIN — ACETAMINOPHEN 650 MG: 325 TABLET ORAL at 05:18

## 2024-02-13 RX ADMIN — OXYCODONE HYDROCHLORIDE 10 MG: 5 TABLET ORAL at 03:47

## 2024-02-13 ASSESSMENT — PAIN SCALES - GENERAL
PAINLEVEL_OUTOF10: 4
PAINLEVEL_OUTOF10: 6
PAINLEVEL_OUTOF10: 1
PAINLEVEL_OUTOF10: 5 - MODERATE PAIN
PAINLEVEL_OUTOF10: 5 - MODERATE PAIN
PAINLEVEL_OUTOF10: 4

## 2024-02-13 ASSESSMENT — COGNITIVE AND FUNCTIONAL STATUS - GENERAL
WALKING IN HOSPITAL ROOM: A LITTLE
DAILY ACTIVITIY SCORE: 22
DRESSING REGULAR LOWER BODY CLOTHING: A LITTLE
MOVING TO AND FROM BED TO CHAIR: A LITTLE
TOILETING: A LITTLE
DAILY ACTIVITIY SCORE: 21
CLIMB 3 TO 5 STEPS WITH RAILING: A LITTLE
WALKING IN HOSPITAL ROOM: A LITTLE
STANDING UP FROM CHAIR USING ARMS: A LITTLE
STANDING UP FROM CHAIR USING ARMS: A LITTLE
HELP NEEDED FOR BATHING: A LITTLE
TURNING FROM BACK TO SIDE WHILE IN FLAT BAD: A LITTLE
DRESSING REGULAR LOWER BODY CLOTHING: A LITTLE
HELP NEEDED FOR BATHING: A LITTLE
MOBILITY SCORE: 18
CLIMB 3 TO 5 STEPS WITH RAILING: A LITTLE
MOBILITY SCORE: 20
MOVING FROM LYING ON BACK TO SITTING ON SIDE OF FLAT BED WITH BEDRAILS: A LITTLE
MOVING TO AND FROM BED TO CHAIR: A LITTLE

## 2024-02-13 ASSESSMENT — ACTIVITIES OF DAILY LIVING (ADL): BATHING_ASSISTANCE: STAND BY

## 2024-02-13 ASSESSMENT — PAIN DESCRIPTION - DESCRIPTORS: DESCRIPTORS: ACHING

## 2024-02-13 ASSESSMENT — PAIN DESCRIPTION - LOCATION
LOCATION: KNEE
LOCATION: KNEE

## 2024-02-13 ASSESSMENT — PAIN - FUNCTIONAL ASSESSMENT
PAIN_FUNCTIONAL_ASSESSMENT: 0-10

## 2024-02-13 ASSESSMENT — PAIN DESCRIPTION - ORIENTATION: ORIENTATION: LEFT

## 2024-02-13 NOTE — HH CARE COORDINATION
Home Care received a Referral for Physical Therapy and Occupational Therapy. We have processed the referral for a Start of Care on 02/14/2024.     If you have any questions or concerns, please feel free to contact us at [phone]. Follow the prompts, enter your five digit zip code, and you will be directed to your care team on [facility].

## 2024-02-13 NOTE — PROGRESS NOTES
02/13/24 0856   Discharge Planning   Home or Post Acute Services In home services   Type of Home Care Services Home OT;Home PT   Patient expects to be discharged to: Home with [facility]     Pt is s/p POD #1 Elective Left total knee arthroplasty 2/12/24 with Dr. Antonio De Luna, pt is weight bearing as tolerated. AMPAC score is PT (15) and recommends continued therapy at low level/intensity. Demographics verified. Pt discharge preference remains home with [facility]. [facility]  notified of [facility] referral/HCO in [facility].

## 2024-02-13 NOTE — PROGRESS NOTES
Physical Therapy    Physical Therapy    Physical Therapy Treatment    Patient Name: Vinicius Gallagher  MRN: [id number]  Today's Date: 2/13/2024  Time Calculation  Start Time: 0907  Stop Time: 0945  Time Calculation (min): 38 min       Assessment/Plan   PT Assessment  PT Assessment Results: Decreased strength, Decreased range of motion, Decreased endurance, Impaired balance, Decreased mobility  Rehab Prognosis: Good  Evaluation/Treatment Tolerance: Patient tolerated treatment well  Medical Staff Made Aware: Yes  Strengths: Ability to acquire knowledge, Attitude of self, Living arrangement secure, Support of Caregivers, Access to adaptive/assistive products, Coping skills, Housing layout  End of Session Communication: PCT/NA/CTA  Assessment Comment:  (Pt progressing toward goals.)  End of Session Patient Position: Up in chair, Alarm on     PT Plan  Treatment/Interventions: Bed mobility, Transfer training, Gait training, Stair training, Strengthening, Range of motion, Therapeutic exercise, Home exercise program  PT Plan: Skilled PT  PT Frequency: BID  PT Discharge Recommendations: Low intensity level of continued care  Equipment Recommended upon Discharge: Straight cane  PT Recommended Transfer Status: Assist x1, Assistive device      Current Problem:  1. Unilateral primary osteoarthritis, left knee        2. S/P total knee arthroplasty, left  aspirin 81 mg EC tablet    cyclobenzaprine (Flexeril) 10 mg tablet    docusate sodium (Colace) 100 mg capsule    oxyCODONE (Roxicodone) 5 mg immediate release tablet    Referral to Home Health          General Visit Information:   PT  Visit  PT Received On: 02/13/24  General  Reason for Referral: L TKR  Family/Caregiver Present: Yes  Prior to Session Communication: Bedside nurse  Patient Position Received: Up in chair, Alarm on  Preferred Learning Style: verbal  Subjective     Precautions:  Precautions  LE Weight Bearing Status: Weight Bearing as Tolerated  Medical Precautions: Fall  precautions  Precautions Comment: knee immobilizer until SLR and/or no buckling         Objective     Pain:  Pain Assessment  Pain Assessment: 0-10  Pain Score: 4  Pain Type: Surgical pain, Acute pain  Pain Location: Knee  Pain Orientation: Left      Extremity/Trunk Assessments:           AROM LLE (degrees)  L Knee Flexion 0-130: 84 (Heel slides on EOC   +  SLR without Ki)  L Knee Extension 0-130: 5 (Passive stretching in longsitting position.)      Treatments:  Therapeutic Exercise  Therapeutic Exercise Performed: Yes  Therapeutic Exercise Activity 1: Pt performed supine ther ex including ankle pumps, quad sets, glut sets B/L LEs and heel slides and SAQ L  LE 10 reps x1, SLR 5 reps x2. Cues for technique and breathing.  Therapeutic Exercise Activity 2: Pt performed seated ther ex including heel/toe raises and hip ABD, LAQ, heel slides, and marches 10 reps x1. Reviewed home exercise program. Educated on goal for 2-3 sets of 20 reps to tolerance. Pt with good understanding.        Bed Mobility  Bed Mobility: Yes  Bed Mobility 1  Bed Mobility 1: Supine to sitting  Level of Assistance 1: Independent  Bed Mobility Comments 1:  (Ptable to transfer onto EOB independently.)  Bed Mobility 2  Bed Mobility  2: Sitting to supine  Level of Assistance 2: Independent  Ambulation/Gait Training  Ambulation/Gait Training Performed: Yes  Ambulation/Gait Training 1  Surface 1: Level tile  Device 1: Rolling walker  Assistance 1: Distant supervision  Quality of Gait 1: Inconsistent stride length  Comments/Distance (ft) 1: Pt ambulates with WW  WBAT without Ki with steady step through gait with nice upright posture shira 100 ftx2 with distant supervision.  Transfers  Transfer: Yes  Transfer 1  Technique 1: Sit to stand, Stand to sit  Transfer Device 1: Walker  Transfer Level of Assistance 1: Close supervision  Stairs  Stairs: Yes  Stairs  Rails 1: None (Comment)  Device 1: Single point cane  Assistance 1: Minimum assistance  Comment/Number  of Steps 1:  (Pt performs stair climbing with 2 HRs, SPC and 1 person assist - 4 steos x2.)       Outcome Measures:  [facility] Basic Mobility  Turning from your back to your side while in a flat bed without using bedrails: None  Moving from lying on your back to sitting on the side of a flat bed without using bedrails: None  Moving to and from bed to chair (including a wheelchair): A little  Standing up from a chair using your arms (e.g. wheelchair or bedside chair): A little  To walk in hospital room: A little  Climbing 3-5 steps with railing: A little  Basic Mobility - Total Score: 20  Education Documentation  No documentation found.  Education Comments  No comments found.        EDUCATION:    Individual(s) Educated: Patient, Spouse  Education Provided: Body Mechanics, Fall Risk, Home Exercise Program, Home Safety  Patient/Caregiver Demonstrated Understanding: yes  Patient Response to Education: Patient/Caregiver Verbalized Understanding of Information, Patient/Caregiver Performed Return Demonstration of Exercises/Activities, Patient/Caregiver Asked Appropriate Questions  Education Comment:  (Pt demonstrates good understanding of HEP and safety with use of walker and transfers.  Wife present throughout session.)  Encounter Problems       Encounter Problems (Active)       PT Problem       Pt. will transfer supine/sit with MOD I (Met)       Start:  02/12/24    Expected End:  02/15/24    Resolved:  02/13/24         Pt. will complete sit/stand and  stand pivot transfers with FWW with MOD I (Progressing)       Start:  02/12/24    Expected End:  02/15/24            Pt.will ambulate 125' with FWW with MOD I (Progressing)       Start:  02/12/24    Expected End:  02/15/24            Pt. will amb up/down 3 steps with cane with CGA  (Progressing)       Start:  02/12/24    Expected End:  02/15/24            Pt. will demonstrate 0-90 degrees AROM L knee  (Progressing)       Start:  02/12/24    Expected End:  02/15/24             Pt. will complete SLR x 10 reps with L VIV  (Met)       Start:  02/12/24    Expected End:  02/15/24    Resolved:  02/13/24               Encounter Problems (Resolved)       Pain - Adult

## 2024-02-13 NOTE — PROGRESS NOTES
Progress Note   TKA    Subjective:     Post-Operative Day: 1 Status Post left Total Knee Arthroplasty  Systemic or Specific Complaints:No Complaints    Objective:     Visit Vitals  /72   Pulse 56   Temp 36.4 °C (97.5 °F)   Resp 18       General: alert and oriented, in no acute distress, appears stated age, and cooperative   Wound: no erythema, no edema, no drainage, and dressing CDI   Motion: Painful range of Motion   DVT Exam: No evidence of DVT seen on physical exam.  Negative Holland's sign.  No significant calf/ankle edema.       Knee swollen but thigh soft to palpation. Moving foot and ankle. Good distal pulses.      Data Review  Recent Results (from the past 24 hour(s))   CBC    Collection Time: 02/13/24  5:45 AM   Result Value Ref Range    WBC 15.5 (H) 4.4 - 11.3 x10*3/uL    nRBC 0.0 0.0 - 0.0 /100 WBCs    RBC 3.84 (L) 4.50 - 5.90 x10*6/uL    Hemoglobin 12.1 (L) 13.5 - 17.5 g/dL    Hematocrit 36.4 (L) 41.0 - 52.0 %    MCV 95 80 - 100 fL    MCH 31.5 26.0 - 34.0 pg    MCHC 33.2 32.0 - 36.0 g/dL    RDW 13.2 11.5 - 14.5 %    Platelets 135 (L) 150 - 450 x10*3/uL   Basic metabolic panel    Collection Time: 02/13/24  5:46 AM   Result Value Ref Range    Glucose 114 (H) 74 - 99 mg/dL    Sodium 139 136 - 145 mmol/L    Potassium 4.6 3.5 - 5.3 mmol/L    Chloride 106 98 - 107 mmol/L    Bicarbonate 26 21 - 32 mmol/L    Anion Gap 12 10 - 20 mmol/L    Urea Nitrogen 28 (H) 6 - 23 mg/dL    Creatinine 1.28 0.50 - 1.30 mg/dL    eGFR 62 >60 mL/min/1.73m*2    Calcium 8.5 (L) 8.6 - 10.3 mg/dL   Iron and TIBC    Collection Time: 02/13/24  5:46 AM   Result Value Ref Range    Iron 41 35 - 150 ug/dL    UIBC      TIBC      % Saturation     Lipid panel    Collection Time: 02/13/24  5:46 AM   Result Value Ref Range    Cholesterol 140 0 - 199 mg/dL    HDL-Cholesterol 59.5 mg/dL    Cholesterol/HDL Ratio 2.4     LDL Calculated 69 <=99 mg/dL    VLDL 12 0 - 40 mg/dL    Triglycerides 60 0 - 149 mg/dL    Non HDL Cholesterol 81 0 - 149  mg/dL   TSH    Collection Time: 02/13/24  5:46 AM   Result Value Ref Range    Thyroid Stimulating Hormone 0.63 0.44 - 3.98 mIU/L   Uric Acid    Collection Time: 02/13/24  5:46 AM   Result Value Ref Range    Uric Acid 5.3 4.0 - 7.5 mg/dL         Assessment:     Status Post left Total Knee Arthroplasty. Doing well postoperatively.     Leukocytosis: WBC count this morning on labs 15.5.  No clinical occasion of acute infection.  Patient afebrile, denies any cough or urinary symptoms.  Exam elevation is most likely secondary to stress of surgery.    Acute postoperative blood loss anemia secondary to expected surgical blood loss.  Patient's hemoglobin this morning 12.1.  Patient asymptomatic, remains hemodynamically stable with no signs of active bleeding.    Acute postoperative pain: Reports mild to moderate pain to operative extremity, exacerbated by movement.  Relieved by rest ice and pain medication.  Continue current pain regimen.    Plan:      1: Discharge today, Return to Clinic: 2 weeks  :  2:  Continue Deep venous thrombosis prophylaxis: Aspirin 81 mg twice a day for 30 days  3:  Continue physical therapy: Weightbearing as tolerated to operative extremity  4:  Continue Pain Control  5.  Discharge planning: Patient plans to return home with  home care, orders placed.  Social work and TCC following for discharge planning.

## 2024-02-13 NOTE — DISCHARGE INSTRUCTIONS
Total Knee Replacement  Discharge Instructions    To prevent Clot formation, you have been placed on the following medication:  ASA 81 mg twice a day for 30 days started on 2/12/24.  Surgical Site Care:  .Change dressing once a day and PRN (as needed). Apply 4 x 4 sponge and light tape. If glue present, leave open to air.  You may leave wound open to air after initial dressing removal, if wound is clean, dry and intact  If Aquacel Ag dressing is present, do not remove dressing for 7 days, unless heavily saturated. If heavily saturated, remove dressing and start using instructions above  Staples will be removed on post-operative day 14 and steri-strips applied  Showering is permitted starting POD1 if waterproof Aquacel dressing is present or when the incision is covered with 4 x 4 and Tegaderm waterproof dressing  Until all areas of incision are healed.    Physical Therapy:  Weight Bearing Status:  WBAT  Precautions, Per Physical Therapy Handout  Pain Medications  You were given  oxycodone  Wean off pain medications as you deem appropriate as long as pain is under control  Cold packs/Ice packs/Machine  May be used 3 times daily for 15-30 minutes as necessary  Be sure to have a barrier (cloth, clothing, towel) between the site and the ice pack to prevent frostbite  Contact [facility] office if  Increased redness, swelling, drainage of any kind, and/or pain to surgery site.  As well as new onset fevers and or chills.  These could signify an infection.  Calf or thigh tenderness to touch as well as increased swelling or redness.  This could signify a clot formation.  Numbness or tingling to an area around the incision site or below the incision site (toes).  Any rash appears, increased  or new onset nausea/vomiting occur.  This may indicate a reaction to a medication.  Phone # [phone].  Follow up with Surgeon   I acknowledge that I have received jean hose and understand the instructions on how and when  to wear them (on during the day, off at night)   Discharging RN who has gone over instructions and acknowledges jean hose have been received

## 2024-02-13 NOTE — PROGRESS NOTES
Vinicius Gallagher is a 64 y.o. male on day 0 of admission presenting with Unilateral primary osteoarthritis, left knee.      Subjective   For medical Management s/p left TKR surgery robot Assisted.        Objective Consults// Medical management     Reason For Consult  For Medical Management     History Of Present Illness  Vinicius Gallagher is a 64 y.o. male presenting with severe Left Knee pain and has failed conservative management now requiring a Left TKR.      Past Medical History  He has a past medical history of Anesthesia of skin (07/20/2021), Eczema, GERD (gastroesophageal reflux disease), Gout, Hearing aid worn, Kidney stones, Osteoarthritis, Personal history of other diseases of the musculoskeletal system and connective tissue (07/20/2021), Rheumatic fever, Sleep apnea, SSS (sick sinus syndrome) (CMS/[facility]), and Wears glasses.    Surgical History  He has a past surgical history that includes Tonsillectomy; Hernia repair; Colonoscopy; Knee arthroscopy w/ meniscal repair (Left); and Hand surgery (Left).     Social History  He reports that he has never smoked. He has never used smokeless tobacco. He reports current alcohol use of about 16.0 standard drinks of alcohol per week. He reports that he does not use drugs.    Family History  Family History   Problem Relation Name Age of Onset    No Known Problems Mother      Other (CABG) Father      Coronary artery disease Father      Other (PTCA) Brother      Other (SEPTIC MYOCARDITIS) Brother      Other (SEPTICEMIA) Brother      Heart attack Maternal Grandmother      Heart attack Paternal Grandfather          Allergies  Monosodium glutamate, Sulfur dioxide, Grass pollen, and Tree and shrub pollen    Review of Systems  Denies any Headache , Dizziness, No CP and No SOB, No N and V , No diarrhea , No dysuria , No gross Hematuria , No Melena no bloody stools                  Vitals 24HR  Heart Rate:  [64-74]   Temp:  [35.4 °C (95.7 °F)-37 °C (98.6 °F)]   Resp:  [14-23]   BP:  "()/(51-86)   Height:  [180.3 cm (5' 11\")]   Weight:  [103 kg (226 lb 3.1 oz)]   SpO2:  [92 %-97 %]         Intake/Output last 3 Shifts:    Intake/Output Summary (Last 24 hours) at 2/12/2024 2002  Last data filed at 2/12/2024 1912  Gross per 24 hour   Intake 1000 ml   Output 350 ml   Net 650 ml       Physical Exam  Constitutional:       Appearance: Normal appearance.   HENT:      Head: Normocephalic and atraumatic.      Nose: Nose normal.      Mouth/Throat:      Mouth: Mucous membranes are moist.   Eyes:      Extraocular Movements: Extraocular movements intact.      Conjunctiva/sclera: Conjunctivae normal.      Pupils: Pupils are equal, round, and reactive to light.   Cardiovascular:      Rate and Rhythm: Normal rate and regular rhythm.      Pulses: Normal pulses.      Heart sounds: Normal heart sounds.   Pulmonary:      Effort: Pulmonary effort is normal.      Breath sounds: Normal breath sounds.   Abdominal:      General: Abdomen is flat.      Palpations: Abdomen is soft.   Musculoskeletal:         General: Swelling present. Normal range of motion.      Cervical back: Normal range of motion and neck supple.      Comments: S/p Left TKR surgery with Robot assistance   Skin:     General: Skin is warm and dry.   Neurological:      General: No focal deficit present.      Mental Status: He is oriented to person, place, and time. Mental status is at baseline.   Psychiatric:         Mood and Affect: Mood normal.         Relevant Results    Current Facility-Administered Medications:     acetaminophen (Tylenol) tablet 487.5 mg, 487.5 mg, oral, q6h PRN, Ben Miranda MD    acetaminophen (Tylenol) tablet 650 mg, 650 mg, oral, q6h STEPHEN, Antonio De Luna MD, 650 mg at 02/12/24 1944    allopurinol (Zyloprim) tablet 100 mg, 100 mg, oral, Daily, Ben Miranda MD, 100 mg at 02/12/24 2000    aspirin EC tablet 81 mg, 81 mg, oral, BID, Antonio De Luna MD    benzocaine-menthol (Cepastat Sore Throat) 15-3.6 mg " lozenge 1 lozenge, 1 lozenge, Mouth/Throat, q4h PRN, Antonio De Luna MD    bisacodyl (Dulcolax) EC tablet 10 mg, 10 mg, oral, Daily PRN, Antonio De Luna MD    ceFAZolin in dextrose (iso-os) (Ancef) IVPB 2 g, 2 g, intravenous, q8h, Antonio De Luna MD, Stopped at 02/12/24 2014    celecoxib (CeleBREX) capsule 50 mg, 50 mg, oral, Daily, Ben Miranda MD    cholecalciferol (Vitamin D-3) tablet 2,000 Units, 2,000 Units, oral, Daily, Ben Miranda MD, 2,000 Units at 02/12/24 2000    cyclobenzaprine (Flexeril) tablet 10 mg, 10 mg, oral, TID PRN, Antonio De Luna MD    diphenhydrAMINE (Sominex) tablet 25 mg, 25 mg, oral, q6h PRN, Antonio De Luna MD    docusate sodium (Colace) capsule 100 mg, 100 mg, oral, BID, Antonio De Luna MD    famotidine (Pepcid) tablet 20 mg, 20 mg, oral, Daily, CARMEN Becerril, 20 mg at 02/12/24 1856    fexofenadine (Allegra) tablet 180 mg, 180 mg, oral, Daily, Ben Miranda MD    HYDROmorphone (Dilaudid) injection 0.5 mg, 0.5 mg, intravenous, q4h PRN, Antonio De Luna MD    [START ON 2/13/2024] hydroxychloroquine (Plaquenil) tablet 200 mg, 200 mg, oral, Daily, CARMEN Becerril    hyoscyamine (Anaspaz) disintegrating tablet 0.125 mg, 0.125 mg, sublingual, Daily, Ben Miranda MD    ketorolac (Toradol) injection 15 mg, 15 mg, intravenous, q6h, Antonio De Luna MD, 15 mg at 02/12/24 1944    lactated Ringer's infusion, 50 mL/hr, intravenous, Continuous, Antonio De Luna MD, Stopped at 02/12/24 1253    lactated Ringer's infusion, 50 mL/hr, intravenous, Continuous, Antonio De Luna MD, Last Rate: 50 mL/hr at 02/12/24 1424, 50 mL/hr at 02/12/24 1424    morphine injection 2 mg, 2 mg, intravenous, q2h PRN, Antonio De Luna MD    naloxone (Narcan) injection 0.2 mg, 0.2 mg, intravenous, q5 min PRN, Antonio De Luna MD    ondansetron ODT (Zofran-ODT) disintegrating tablet 4 mg, 4 mg, oral, q8h PRN **OR** ondansetron  (Zofran) injection 4 mg, 4 mg, intravenous, q8h PRN, Antonio De Luna MD    oxyCODONE (Roxicodone) immediate release tablet 10 mg, 10 mg, oral, q4h PRN, Antonio De Luna MD, 10 mg at 02/12/24 1858    oxyCODONE (Roxicodone) immediate release tablet 10 mg, 10 mg, oral, q4h PRN, Braeden Ramos DO    oxyCODONE (Roxicodone) immediate release tablet 5 mg, 5 mg, oral, q4h PRN, Antonio De Luna MD    oxyCODONE (Roxicodone) immediate release tablet 5 mg, 5 mg, oral, q4h PRN, Braeden Ramos DO    oxyCODONE (Roxicodone) immediate release tablet 5 mg, 5 mg, oral, q4h PRN, Braeden Ramos DO    oxygen (O2) therapy, 2 L/min, inhalation, Continuous, Antonio De Luna MD, 2 L/min at 02/12/24 1430    promethazine (Phenergan) 6.25 mg in sodium chloride 0.9% 50 mL IV, 6.25 mg, intravenous, Once PRN, Braeden Ramos DO    [START ON 2/13/2024] tranexamic acid (Lysteda) tablet 1,950 mg, 1,950 mg, oral, Once, Antonio D eLuna MD No results found for this or any previous visit (from the past 24 hour(s)).     XR knee left 1-2 views    Result Date: 2/12/2024  Interpreted By:  Maverick Orozco, STUDY: XR KNEE LEFT 1-2 VIEWS;  2/12/2024 1:20 pm   INDICATION: Signs/Symptoms:Post-op knee.   COMPARISON: 10/24/2023   ACCESSION NUMBER(S): [id number]   ORDERING CLINICIAN: ANTONIO DE LUNA   FINDINGS: Two views of the left knee are obtained. Total left knee arthroplasty with intact alignment. No acute fracture or dislocation. No lytic or blastic lesions or periosteal reaction.       No acute fracture or dislocation. Total left knee arthroplasty with intact alignment.     Signed by: Maverick Orozco 2/12/2024 4:10 PM Dictation workstation:   [id number]    CT knee left wo IV contrast    Result Date: 2/1/2024  Interpreted By:  Jesus Rahman, STUDY: CT of the left lower extremity   without intravenous contrast dated 2/1/2024.   INDICATION: Signs/Symptoms:pain   COMPARISON: None.   ACCESSION NUMBER(S): [id number]   ORDERING  CLINICIAN: MATEO ALFONSO   TECHNIQUE: Axial CT of the  left lower extremity  was performed  without intravenous contrast. A surgical planning protocol was utilized. Sagittal and coronal 2 dimensional reformats were obtained.   FINDINGS: OSSEOUS STRUCTURES AND JOINTS:   No fracture or dislocation is evident. Mild degenerative changes seen of the right hip. No right hip joint effusion is evident. There is severe medial femorotibial moderate to severe lateral femorotibial and patellofemoral degenerative change of the left knee. There is a small volume left knee joint effusion. Mild degenerative changes seen in the midfoot. No ankle joint effusion is evident.   MUSCLES AND TENDONS:   Muscles and tendons are grossly unremarkable as seen on CT.   ASSOCIATED SOFT TISSUES:   There is a small volume popliteal cyst with intracapsular osteochondral bodies. Calcified atheromatous disease is seen in the visualized arterial tree.       1. Degenerative change without osseous injury evident. 2. Small volume left knee joint effusion. 3. Small volume popliteal cyst with intracapsular osteochondral bodies.   MACRO: none   Signed by: Jesus Rahman 2/1/2024 10:27 AM Dictation workstation:   [id number]      Assessment/Plan   S/P Left TKR , Robot assisted   Severe DJD  GERD  Gout  H/O Kidney stones   DJD  Rheumatic Fever  Obstructive Sleep Apnea  SSS   Wears glasses          Principal Problem:    Unilateral primary osteoarthritis, left knee  Active Problems:    MUSTAPHA (obstructive sleep apnea)    S/P total knee arthroplasty, left  Plan:  Resume Home medication.  GI / DVT prophylaxis   PT/OT  CPAP / Bipap   Chemistry Panel as ordered              I spent 43 minutes in the professional and overall care of this patient.      Ben Miranda MD

## 2024-02-13 NOTE — PROGRESS NOTES
02/12/24 1907   Discharge Planning   Living Arrangements Spouse/significant other   Support Systems Spouse/significant other;Family members   Assistance Needed PTA ind in ADLS and shared IADLS with spouse, drove, denies falls. Owns RWW, walk-in shower with seat, higher toilet   Type of Residence Private residence   Number of Stairs to Enter Residence 3   Number of Stairs Within Residence 12   Do you have animals or pets at home? Yes   Type of Animals or Pets 1 dog (Labrador)   Home or Post Acute Services In home services   Type of Home Care Services Home OT;Home PT   Patient expects to be discharged to: Home with [facility]   Does the patient need discharge transport arranged? No   Financial Resource Strain   How hard is it for you to pay for the very basics like food, housing, medical care, and heating? Not hard   Housing Stability   In the last 12 months, was there a time when you were not able to pay the mortgage or rent on time? N   In the last 12 months, how many places have you lived? 1   In the last 12 months, was there a time when you did not have a steady place to sleep or slept in a shelter (including now)? N   Transportation Needs   In the past 12 months, has lack of transportation kept you from medical appointments or from getting medications? no   In the past 12 months, has lack of transportation kept you from meetings, work, or from getting things needed for daily living? No   Patient Choice   Provider Choice list and CMS website (https://medicare.gov/care-compare#search) for post-acute Quality and Resource Measure Data were provided and reviewed with: Patient   Patient / Family choosing to utilize agency / facility established prior to hospitalization No  (chose [facility])     Pt is s/p Elective Left total knee arthroplasty today 2/12/24 with Dr. Antonio De Luna, pt is weight bearing as tolerated. AMPAC score is PT (15) and recommends continued therapy at low level/intensity. Pt in agreement and prefers  home with HHC, [facility].  Demographics verified. CNP notified of pts HHC agency of choice.

## 2024-02-13 NOTE — DISCHARGE SUMMARY
Discharge summary  This patient Vinicius Gallagher was admitted to the hospital on 2/12/2024  after undergoing Procedure(s) (LRB):  Arthroplasty Total Knee Robot-Assisted (Left) without complications that morning.    During the postoperative period,while in hospital, patient was medically managed by the hospitalist. Please see medial notes and H&P for patients additional diagnoses.  Ortho agrees with all medical diagnoses and treatments while patient in hospital.  No significant or unexpected findings or abnormal ortho imaging were noted during the hospital stay    Hospital course      Patient tolerated surgical procedure well and there was no complications. Patient progressed adequately through their recovery during hospital stay including PT and rehabilitation.    Patient was then D/C on  to home  in stable condition.  Patient was instructed on the use of pain medications, the signs and symptoms of infection, and was given our number to call should they have any questions or concerns following discharge.    Based on my clinical judgment, the patient was provided with a 7-day prescription for opioid medication at 30 MED, indicated for treatment of acute pain in the setting of recent surgery. OARRS report was run and has demonstrated an appropriate time course.  The patient has been provided with counseling pertaining to safe use of opioid medication.      Patient may weightbearing as tolerated to operative extremity with use of walker for assistance with ambulation   Aquacel dressing to be removed pod7 and incision left open to air  Aspirin 81 mg twice daily for DVT prophylaxis started on 2/12/24 and to be taken for 30 days  Follow up with surgeon in 2 weeks

## 2024-02-13 NOTE — NURSING NOTE
Discharge instructions given to pt. Pt has medications from meds to beds. All questions answered. Pt in for discharge.

## 2024-02-13 NOTE — PROGRESS NOTES
Occupational Therapy    Evaluation    Patient Name: Vinicius Gallagher  MRN: [id number]  Today's Date: 2/13/2024  Time Calculation  Start Time: 0833  Stop Time: 0858  Time Calculation (min): 25 min        Assessment:  OT Assessment: SBA/Independent for all ADLs and functional transfers/mobility  Prognosis: Good  Evaluation/Treatment Tolerance: Patient tolerated treatment well  End of Session Communication: Bedside nurse, PCT/NA/CTA  End of Session Patient Position: Alarm on, Up in chair  Prognosis: Good  Evaluation/Treatment Tolerance: Patient tolerated treatment well  Plan:  No Skilled OT: No acute OT goals identified  OT Frequency: OT eval only  OT - OK to Discharge: Yes (when medically stable.)       Subjective   Current Problem:  1. Unilateral primary osteoarthritis, left knee        2. S/P total knee arthroplasty, left  aspirin 81 mg EC tablet    cyclobenzaprine (Flexeril) 10 mg tablet    docusate sodium (Colace) 100 mg capsule    oxyCODONE (Roxicodone) 5 mg immediate release tablet    Referral to Home Health        General:  General  Reason for Referral: ADL impairment  Referred By: Calixto DANG  Past Medical History Relevant to Rehab: includes: MUSTAPHA, OA, SSS gout, renal calculi, [place], rheumatic fever  Prior to Session Communication: Bedside nurse  Patient Position Received: Up in chair, Alarm on  General Comment: pt. is 65 y/o male s/p elective L TKA with Dr. De Luna.  Precautions:  LE Weight Bearing Status: Weight Bearing as Tolerated  Medical Precautions: Fall precautions  Precautions Comment: KI until SLR; pt. able to demonstrate SLR; removed KI for session.    Pain:  Pain Assessment  Pain Assessment: 0-10  Pain Score: 5 - Moderate pain  Pain Type: Surgical pain  Pain Location: Knee  Pain Orientation: Left  Pain Descriptors:  (Pt. states the muscles around the knee are sore)    Objective   Cognition:  Overall Cognitive Status: Within Functional Limits     Home Living:  Home Living Comments: Pt. lives with spouse  in a 1 level house with 3 [address]IGU[address] without HR. Bed/bath on 1st floor with walkin shower with a seat but no grab bars. Laundry on 1st floor  Prior Function:  Prior Function Comments: Pt. amb without AD PTA but owns a a FWW. Pt. does not own a cane. Pt. was I with ADLs and shared IADLs with spouse PTA. Pt. denied falls in last 3 months. Pt. drove PTA    ADL:  Eating Assistance: Independent  Grooming Assistance: Independent  Bathing Assistance: Stand by  UE Dressing Assistance: Independent  LE Dressing Assistance: Stand by  Toileting Assistance with Device: Independent  Activity Tolerance:  Endurance: Endurance does not limit participation in activity  Bed Mobility/Transfers: Bed Mobility  Bed Mobility: No    Transfers  Transfer: Yes  Transfer 1  Technique 1: Sit to stand, Stand to sit (to and from recliner chair and to and from toilet; raised toilet seat)  Transfer Device 1: Walker  Transfer Level of Assistance 1:  (SBA)  Trials/Comments 1: SBA for safety; VCs for hand placement  Transfers 2  Technique 2: Stand pivot  Transfer Device 2: Walker  Trials/Comments 2: SBA for safety    Ambulation/Gait Training:  Ambulation/Gait Training  Ambulation/Gait Training Performed:  (~35 feet in room for functional distances for ADLs; WW use. SBA for safety and steadying.)  Sitting Balance:  Static Sitting Balance  Static Sitting-Level of Assistance: Independent  Standing Balance:  Static Standing Balance  Static Standing-Level of Assistance: Close supervision  Dynamic Standing Balance  Dynamic Standing-Comments: Fair + with WW     Strength:  Strength Comments: BUEs WNL MMT    Hand Function:  Gross Grasp: Functional  Coordination: Functional  Extremities: RUE   RUE : Within Functional Limits and LUE   LUE: Within Functional Limits    Outcome Measures:[facility] Daily Activity  Putting on and taking off regular lower body clothing: A little  Bathing (including washing, rinsing, drying): A little  Putting on and taking off regular upper body  clothing: None  Toileting, which includes using toilet, bedpan or urinal: None  Taking care of personal grooming such as brushing teeth: None  Eating Meals: None  Daily Activity - Total Score: 22      Education Documentation  ADL Training, taught by Jody Hinds OT at 2/13/2024 11:36 AM.  Learner: Patient  Readiness: Eager  Method: Explanation  Response: Verbalizes Understanding, Demonstrated Understanding  Comment: ADl safety, LB dressing, compensatory training, AE/DME for bathroom for ease with ADLs. Pt. demo good understanding.    IP EDUCATION:  Education  Individual(s) Educated: Patient  Education Provided: Fall precautons, Risk and benefits of OT discussed with patient or other, POC discussed and agreed upon  Patient Response to Education: Patient/Caregiver Verbalized Understanding of Information

## 2024-02-14 ENCOUNTER — HOME CARE VISIT (OUTPATIENT)
Dept: HOME HEALTH SERVICES | Facility: HOME HEALTH | Age: 64
End: 2024-02-14
Payer: MEDICARE

## 2024-02-14 ENCOUNTER — TELEPHONE (OUTPATIENT)
Dept: PHYSICAL THERAPY | Facility: CLINIC | Age: 64
End: 2024-02-14
Payer: MEDICARE

## 2024-02-14 VITALS
SYSTOLIC BLOOD PRESSURE: 124 MMHG | TEMPERATURE: 98.4 F | HEIGHT: 70 IN | BODY MASS INDEX: 32.35 KG/M2 | WEIGHT: 226 LBS | HEART RATE: 88 BPM | DIASTOLIC BLOOD PRESSURE: 74 MMHG

## 2024-02-14 PROCEDURE — G0152 HHCP-SERV OF OT,EA 15 MIN: HCPCS

## 2024-02-14 PROCEDURE — 0023 HH SOC

## 2024-02-14 PROCEDURE — G0151 HHCP-SERV OF PT,EA 15 MIN: HCPCS

## 2024-02-14 SDOH — HEALTH STABILITY: PHYSICAL HEALTH
EXERCISE COMMENTS: INSTRUCTED IN AND GIVEN WRITTEN PROGRAM. SUPINE ANKLE P DORSI, QUAD ISOMET, SLR, TKE. SEATED HEEL SLIDES, KNEE EXT STRETCH. STAND AT BASE OF STAIRS L FOOT UP ON 1ST STEP, FWD LEAN

## 2024-02-14 ASSESSMENT — ENCOUNTER SYMPTOMS
PAIN SEVERITY GOAL: 1/10
SUBJECTIVE PAIN PROGRESSION: UNCHANGED
PERSON REPORTING PAIN: PATIENT
LOWEST PAIN SEVERITY IN PAST 24 HOURS: 3/10
PAIN LOCATION - PAIN SEVERITY: 3/10
PAIN: 1
PAIN SEVERITY GOAL: 0/10
LOWEST PAIN SEVERITY IN PAST 24 HOURS: 3/10
HIGHEST PAIN SEVERITY IN PAST 24 HOURS: 8/10
PAIN LOCATION: LEFT KNEE
PAIN LOCATION - RELIEVING FACTORS: MEDS REST ICE
PERSON REPORTING PAIN: PATIENT
HIGHEST PAIN SEVERITY IN PAST 24 HOURS: 10/10
PAIN LOCATION - PAIN QUALITY: ACHE
SUBJECTIVE PAIN PROGRESSION: GRADUALLY IMPROVING
PAIN: 1
PAIN LOCATION: LEFT KNEE

## 2024-02-14 ASSESSMENT — ACTIVITIES OF DAILY LIVING (ADL)
OASIS_M1830: 03
AMBULATION ASSISTANCE ON FLAT SURFACES: 1
BATHING ASSESSED: 1
DRESSING_LB_CURRENT_FUNCTION: STAND BY ASSIST
BATHING_CURRENT_FUNCTION: STAND BY ASSIST
ENTERING_EXITING_HOME: STAND BY ASSIST

## 2024-02-14 NOTE — PROGRESS NOTES
Spoke with pt, informed him we have PT office in [place] which is closer to his home, will have [place] office call him tomorrow to schedule his OPPT upon DC from [facility]

## 2024-02-15 ENCOUNTER — HOME CARE VISIT (OUTPATIENT)
Dept: HOME HEALTH SERVICES | Facility: HOME HEALTH | Age: 64
End: 2024-02-15
Payer: MEDICARE

## 2024-02-15 PROCEDURE — G0157 HHC PT ASSISTANT EA 15: HCPCS

## 2024-02-15 ASSESSMENT — PAIN DESCRIPTION - PAIN TYPE: TYPE: SURGICAL PAIN

## 2024-02-15 NOTE — HOME HEALTH
pt lives with wife in 2 story home, 3 stairs to enter front entrance. pt id self, wife present during session. preferred bedrm and full bath on 2nd flr. has musa bed and full bath also on 1st flr. pt ambul without device pre l tka [id number]. goal for p.t. is to be able to have mobilty so he can drive. to see dr ortega [id number]. plans to start out pt. p.t. [id number] at [facility] 4

## 2024-02-20 ENCOUNTER — HOME CARE VISIT (OUTPATIENT)
Dept: HOME HEALTH SERVICES | Facility: HOME HEALTH | Age: 64
End: 2024-02-20
Payer: MEDICARE

## 2024-02-21 ENCOUNTER — HOME CARE VISIT (OUTPATIENT)
Dept: HOME HEALTH SERVICES | Facility: HOME HEALTH | Age: 64
End: 2024-02-21
Payer: MEDICARE

## 2024-02-21 PROCEDURE — G0180 MD CERTIFICATION HHA PATIENT: HCPCS | Performed by: ORTHOPAEDIC SURGERY

## 2024-02-22 ENCOUNTER — HOME CARE VISIT (OUTPATIENT)
Dept: HOME HEALTH SERVICES | Facility: HOME HEALTH | Age: 64
End: 2024-02-22
Payer: MEDICARE

## 2024-02-22 PROCEDURE — G0157 HHC PT ASSISTANT EA 15: HCPCS

## 2024-02-22 ASSESSMENT — PAIN DESCRIPTION - PAIN TYPE: TYPE: SURGICAL PAIN

## 2024-02-23 ENCOUNTER — HOME CARE VISIT (OUTPATIENT)
Dept: HOME HEALTH SERVICES | Facility: HOME HEALTH | Age: 64
End: 2024-02-23
Payer: MEDICARE

## 2024-02-23 PROCEDURE — G0157 HHC PT ASSISTANT EA 15: HCPCS

## 2024-02-23 ASSESSMENT — PAIN DESCRIPTION - PAIN TYPE: TYPE: SURGICAL PAIN

## 2024-02-26 ENCOUNTER — HOME CARE VISIT (OUTPATIENT)
Dept: HOME HEALTH SERVICES | Facility: HOME HEALTH | Age: 64
End: 2024-02-26
Payer: MEDICARE

## 2024-02-26 VITALS
OXYGEN SATURATION: 96 % | SYSTOLIC BLOOD PRESSURE: 122 MMHG | RESPIRATION RATE: 14 BRPM | HEART RATE: 87 BPM | DIASTOLIC BLOOD PRESSURE: 64 MMHG | TEMPERATURE: 97.9 F

## 2024-02-26 PROCEDURE — G0151 HHCP-SERV OF PT,EA 15 MIN: HCPCS

## 2024-02-26 SDOH — HEALTH STABILITY: PHYSICAL HEALTH: EXERCISE ACTIVITIES SETS: 1

## 2024-02-26 SDOH — HEALTH STABILITY: PHYSICAL HEALTH: PHYSICAL EXERCISE: STANDING

## 2024-02-26 SDOH — HEALTH STABILITY: PHYSICAL HEALTH: PHYSICAL EXERCISE: 20

## 2024-02-26 SDOH — HEALTH STABILITY: PHYSICAL HEALTH: PHYSICAL EXERCISE: SUPINE

## 2024-02-26 SDOH — HEALTH STABILITY: PHYSICAL HEALTH: PHYSICAL EXERCISE: SEATED

## 2024-02-26 SDOH — HEALTH STABILITY: PHYSICAL HEALTH: EXERCISE ACTIVITY: HIP ABD/ADD

## 2024-02-26 SDOH — HEALTH STABILITY: PHYSICAL HEALTH: EXERCISE ACTIVITY: MINI QUADS

## 2024-02-26 SDOH — HEALTH STABILITY: PHYSICAL HEALTH: EXERCISE TYPE: LE EXERCISES

## 2024-02-26 SDOH — HEALTH STABILITY: PHYSICAL HEALTH: EXERCISE ACTIVITY: MARCHING

## 2024-02-26 SDOH — HEALTH STABILITY: PHYSICAL HEALTH: EXERCISE ACTIVITY: HS CURLS

## 2024-02-26 SDOH — HEALTH STABILITY: PHYSICAL HEALTH: EXERCISE ACTIVITY: QUAD SETS

## 2024-02-26 SDOH — HEALTH STABILITY: PHYSICAL HEALTH: EXERCISE ACTIVITY: HIP EXTENSION

## 2024-02-26 SDOH — HEALTH STABILITY: PHYSICAL HEALTH: EXERCISE ACTIVITY: LAQ

## 2024-02-26 SDOH — HEALTH STABILITY: PHYSICAL HEALTH: EXERCISE ACTIVITY: HEEL RAISES/TOE RAISES

## 2024-02-26 SDOH — HEALTH STABILITY: PHYSICAL HEALTH: EXERCISE ACTIVITY: SLR

## 2024-02-26 SDOH — HEALTH STABILITY: PHYSICAL HEALTH: EXERCISE ACTIVITY: HIP ABDUCTION

## 2024-02-26 SDOH — HEALTH STABILITY: PHYSICAL HEALTH: EXERCISE ACTIVITY: ANKLE DF/PF

## 2024-02-26 ASSESSMENT — BALANCE ASSESSMENTS
IMMEDIATE STANDING BALANCE FIRST 5 SECONDS: 2 - STEADY WITHOUT WALKER OR OTHER SUPPORT
ARISING SCORE: 1
STANDING BALANCE: 1 - STEADY BUT WIDE STANCE AND USES CANE OR OTHER SUPPORT
TURNING 360 DEGREES STEPS: 1 - CONTINUOUS STEPS
SITTING BALANCE: 1 - STEADY, SAFE
EYES CLOSED AT MAXIMUM POSITION NUDGED: 1 - STEADY
ATTEMPTS TO ARISE: 2 - ABLE TO RISE, ONE ATTEMPT
ARISES: 1 - ABLE, USES ARMS TO HELP
NUDGED SCORE: 2
BALANCE SCORE: 13
NUDGED: 2 - STEADY
SITTING DOWN: 1 - USES ARMS OR NOT SMOOTH MOTION

## 2024-02-26 ASSESSMENT — GAIT ASSESSMENTS
WALKING STANCE: 1 - HEELS ALMOST TOUCHING WHILE WALKING
TRUNK SCORE: 1
PATH SCORE: 1
STEP SYMMETRY: 1 - RIGHT AND LEFT STEP LENGTH APPEAR EQUAL
GAIT SCORE: 10
BALANCE AND GAIT SCORE: 23
STEP CONTINUITY: 1 - STEPS APPEAR CONTINUOUS
PATH: 1 - MILD/MODERATE DEVIATION OR USES WALKING AID
INITIATION OF GAIT IMMEDIATELY AFTER GO: 1 - NO HESITANCY
TRUNK: 1 - NO SWAY BUT FLEXION OF KNEES OR BACK OR SPREADS ARMS WHILE WALKING

## 2024-02-26 ASSESSMENT — ENCOUNTER SYMPTOMS
MUSCLE WEAKNESS: 1
PAIN LOCATION - EXACERBATING FACTORS: EXERCISES
OCCASIONAL FEELINGS OF UNSTEADINESS: 1
PAIN: 1
PAIN LOCATION - PAIN SEVERITY: 2/10
PAIN SEVERITY GOAL: 0/10
HIGHEST PAIN SEVERITY IN PAST 24 HOURS: 7/10
LOWEST PAIN SEVERITY IN PAST 24 HOURS: 1/10
PERSON REPORTING PAIN: PATIENT
PAIN LOCATION: LEFT KNEE
PAIN LOCATION - PAIN QUALITY: SHARP
SUBJECTIVE PAIN PROGRESSION: GRADUALLY IMPROVING
PAIN LOCATION - PAIN FREQUENCY: INTERMITTENT

## 2024-02-26 ASSESSMENT — ACTIVITIES OF DAILY LIVING (ADL)
AMBULATION_DISTANCE/DURATION_TOLERATED: 150 FT X 2
AMBULATION ASSISTANCE ON FLAT SURFACES: 1
OASIS_M1830: 00
HOME_HEALTH_OASIS: 00

## 2024-02-28 ENCOUNTER — HOSPITAL ENCOUNTER (OUTPATIENT)
Dept: RADIOLOGY | Facility: CLINIC | Age: 64
Discharge: HOME | End: 2024-02-28
Payer: MEDICARE

## 2024-02-28 ENCOUNTER — OFFICE VISIT (OUTPATIENT)
Dept: ORTHOPEDIC SURGERY | Facility: CLINIC | Age: 64
End: 2024-02-28
Payer: MEDICARE

## 2024-02-28 ENCOUNTER — EVALUATION (OUTPATIENT)
Dept: PHYSICAL THERAPY | Facility: CLINIC | Age: 64
End: 2024-02-28
Payer: MEDICARE

## 2024-02-28 DIAGNOSIS — M17.12 UNILATERAL PRIMARY OSTEOARTHRITIS, LEFT KNEE: Primary | ICD-10-CM

## 2024-02-28 DIAGNOSIS — Z47.1 AFTERCARE FOLLOWING LEFT KNEE JOINT REPLACEMENT SURGERY: ICD-10-CM

## 2024-02-28 DIAGNOSIS — Z96.652 AFTERCARE FOLLOWING LEFT KNEE JOINT REPLACEMENT SURGERY: ICD-10-CM

## 2024-02-28 DIAGNOSIS — Z47.1 AFTERCARE FOLLOWING LEFT KNEE JOINT REPLACEMENT SURGERY: Primary | ICD-10-CM

## 2024-02-28 DIAGNOSIS — Z96.652 AFTERCARE FOLLOWING LEFT KNEE JOINT REPLACEMENT SURGERY: Primary | ICD-10-CM

## 2024-02-28 DIAGNOSIS — Z96.652 S/P TOTAL KNEE ARTHROPLASTY, LEFT: ICD-10-CM

## 2024-02-28 PROCEDURE — 1157F ADVNC CARE PLAN IN RCRD: CPT | Performed by: ORTHOPAEDIC SURGERY

## 2024-02-28 PROCEDURE — 97110 THERAPEUTIC EXERCISES: CPT | Mod: GP

## 2024-02-28 PROCEDURE — 97164 PT RE-EVAL EST PLAN CARE: CPT | Mod: GP

## 2024-02-28 PROCEDURE — 99024 POSTOP FOLLOW-UP VISIT: CPT | Performed by: ORTHOPAEDIC SURGERY

## 2024-02-28 PROCEDURE — 1159F MED LIST DOCD IN RCRD: CPT | Performed by: ORTHOPAEDIC SURGERY

## 2024-02-28 PROCEDURE — 73562 X-RAY EXAM OF KNEE 3: CPT | Mod: LT

## 2024-02-28 PROCEDURE — 73562 X-RAY EXAM OF KNEE 3: CPT | Mod: LEFT SIDE | Performed by: ORTHOPAEDIC SURGERY

## 2024-02-28 PROCEDURE — 1125F AMNT PAIN NOTED PAIN PRSNT: CPT | Performed by: ORTHOPAEDIC SURGERY

## 2024-02-28 PROCEDURE — 1036F TOBACCO NON-USER: CPT | Performed by: ORTHOPAEDIC SURGERY

## 2024-02-28 RX ORDER — CYCLOBENZAPRINE HCL 10 MG
10 TABLET ORAL NIGHTLY PRN
Qty: 30 TABLET | Refills: 0 | Status: SHIPPED | OUTPATIENT
Start: 2024-02-28 | End: 2024-03-29

## 2024-02-28 NOTE — PROGRESS NOTES
Physical Therapy Re-Evaluation    Patient Name: Vinicius Gallagher  MRN: [id number]  Time Calculation  Start Time: 0830  Stop Time: 0855  Time Calculation (min): 25 min  PT Evaluation Time Entry  PT Re-Evaluation Time Entry: 15  PT Therapeutic Procedures Time Entry  Therapeutic Exercise Time Entry: 10                   Current Problem  1. Unilateral primary osteoarthritis, left knee          Insurance reviewed   Visit number: 2  Approved number of visits: BMN   [facility] MEDICARE BMN      Subjective   General:  Pt is a 64 y.o. M that presents to the clinic s/p L TKR with Dr. De Luna on Feb 12, 2024. Reports that scar incision is intact and dry. Denies any S/Sx of infection and DVT. He states that surgery went well. He presents to the clinic ambulating with a straight cane     Pain:  1/10     Reviewed medical screening form with pt and medical screening assessed    Objective   Knee Musculoskeletal Exam  Gait    Assistive device: cane    Inspection    Right      Right knee inspection is normal.      Left      Edema: mild        Incision: clean, dry and intact      Incisional drainage: none    Palpation    Right      Right knee palpation is unremarkable.      Left      Tenderness: none      Range of Motion    Right      Right knee range of motion is normal and full.      Left      Active extension: 0      Passive extension: 0      Active flexion: 105      Passive flexion: 110    Strength    Right      Right knee strength is normal.     Left      Extension: 4+/5.       Flexion: 4+/5.     Strength additional comments: Hip abduction: 3+/5     Outcome Measures:    LEFS: 39/80    Treatment: See HEP below  Includes post op measurements     EDUCATION/HEP:  SLR, SL clamshells, seated knee flexion with towel, bridges   Assessment:  Pt is a 64 y.o. M that presents to the clinic s/p L TKR with Dr. De Luna on Feb 12, 2024. Reports that scar incision is intact and dry. Denies any S/Sx of infection and DVT. He states that surgery went well.  He presents to the clinic ambulating with a straight cane. Treatment session was cut short d/t ortho appointment. Pt was educated on HEP and to continue exercises that home health gave him. Overall, ROM and strength is mildly limited. Pt stands to benefit from skilled PT in order to improve ROM and strength in order to return to PLOF     Clinical Presentation: Stable     Level of Complexity: Low     Goals:  Pt will be independent with advanced HEP   Pt will increase Thanh LE strength 4+-5/5 including good eccentric quad to perform all functional mobility  Pt will demo L knee AROM 0-120 deg to perform all functional mobility  Pt will demo L LE SLS >/=10 sec without UE assist on compliant surface for functional carryover into dynamic balance  Pt will negotiate flight of stairs mod I reciprocally without increased knee pain  Pt will ambulate community distances independent over varying surfaces/inclines without increased * knee pain                    Pt will improve LEFS score by at least 9 points (MCID) to indicate significant improvement in functional abilities.      Plan  2x/week for 8 visits     Planned interventions include: blood flow restriction, aquatic therapy, biofeedback, cryotherapy, dry needling, edema control, education/instruction, electrical stimulation, gait training, home program, hot pack, kinesiotaping, manual therapy, neuromuscular re-education, self care/home management, therapeutic activities, therapeutic exercises, ultrasound and vasopneumatic device w/ cold.     SUZANNE CARR, S-PT

## 2024-02-28 NOTE — PROGRESS NOTES
Subjective    Patient ID: Vinicius    Chief Complaint:   Chief Complaint   Patient presents with    Left Knee - Post-op Visit     LT DENNY TKR 2/12/24, 2 weeks out, with x-rays today     History of present illness    64-year-old gentleman presented clinic today for his first postop follow-up status post left total knee Denny performed 2/12/2024.  Doing extremely well postoperatively this time.  He had his first outpatient physical therapy appointment this morning.  He is ambulating with the assistance of a cane.  Starting to wean himself off the cane around the house.  He states he just been taking Tylenol for pain relief.  He stopped his oxycodone 1 week ago.  No longer having spasms at night.  He was taking his muscle relaxant mostly for his low back pain from sleeping supine.  No numbness tingling no fevers chills reported.      Past medical , Surgical, Family and social history reviewed.      Physical exam  General: No acute distress and breathing comfortably.  Patient is pleasant and cooperative with the examination.    Extremity  Left knee is neurovascular intact.  He has good range of motion today 0 to 102 degrees.  Mild edema left knee.  Mild ecchymosis.  Mild tightness over the IT band laterally.  No calf swelling or tenderness.  Compartments soft.  Capillary refill is within normal is distally.  No signs of DVT.  Incision is well-healed without abnormality or infection.    Diagnostics  [ none]  XR knee left 3 views    Result Date: 2/28/2024  Interpreted By:  Antonio De Luna, STUDY: <Procedure Description>;  <Laterality>;  <Completed Time>   INDICATION: <Reason For Exam>.   ACCESSION NUMBER(S): <Accession Number>   ORDERING CLINICIAN: <Ordering Clinician>   FINDINGS: Left knee three views. Post total knee replacement components in good position no signs of fracture dislocation or other bony abnormality       Signed by: Antonio De Luna 2/28/2024 9:29 AM Dictation workstation:   [id number]    XR knee left  1-2 views    Result Date: 2/12/2024  Interpreted By:  Maverick Orozco, STUDY: XR KNEE LEFT 1-2 VIEWS;  2/12/2024 1:20 pm   INDICATION: Signs/Symptoms:Post-op knee.   COMPARISON: 10/24/2023   ACCESSION NUMBER(S): [id number]   ORDERING CLINICIAN: MATEO ALFONSO   FINDINGS: Two views of the left knee are obtained. Total left knee arthroplasty with intact alignment. No acute fracture or dislocation. No lytic or blastic lesions or periosteal reaction.       No acute fracture or dislocation. Total left knee arthroplasty with intact alignment.     Signed by: Maverick Orozco 2/12/2024 4:10 PM Dictation workstation:   [id number]    CT knee left wo IV contrast    Result Date: 2/1/2024  Interpreted By:  Jesus Rahman, STUDY: CT of the left lower extremity   without intravenous contrast dated 2/1/2024.   INDICATION: Signs/Symptoms:pain   COMPARISON: None.   ACCESSION NUMBER(S): [id number]   ORDERING CLINICIAN: MATEO ALFONSO   TECHNIQUE: Axial CT of the  left lower extremity  was performed  without intravenous contrast. A surgical planning protocol was utilized. Sagittal and coronal 2 dimensional reformats were obtained.   FINDINGS: OSSEOUS STRUCTURES AND JOINTS:   No fracture or dislocation is evident. Mild degenerative changes seen of the right hip. No right hip joint effusion is evident. There is severe medial femorotibial moderate to severe lateral femorotibial and patellofemoral degenerative change of the left knee. There is a small volume left knee joint effusion. Mild degenerative changes seen in the midfoot. No ankle joint effusion is evident.   MUSCLES AND TENDONS:   Muscles and tendons are grossly unremarkable as seen on CT.   ASSOCIATED SOFT TISSUES:   There is a small volume popliteal cyst with intracapsular osteochondral bodies. Calcified atheromatous disease is seen in the visualized arterial tree.       1. Degenerative change without osseous injury evident. 2. Small volume left knee joint effusion. 3.  Small volume popliteal cyst with intracapsular osteochondral bodies.   MACRO: none   Signed by: Jesus Rahman 2/1/2024 10:27 AM Dictation workstation:   [id number]       Procedure  [ none]    Assessment  Status post left total knee Denny    Treatment plan  1.  Wound instructions were discussed with patient today.  2.  He was encouraged to continue with weightbearing activity as tolerated.  Prescription refill cyclobenzaprine sent to the pharmacy just in case he needs more relief with his back spasm after continuing outpatient therapy.  3.  He will follow-up with us in approximately 3 weeks for reevaluation without x-ray.  4.  All of the patient's questions were answered.    This note was prepared using voice recognition software.  The details of this note are correct and have been reviewed, and corrected to the best of my ability.  Some grammatical areas may persist related to the Dragon software    Otf Schmid PA-C, [facility]  Orthopedic [place]    [phone]

## 2024-03-04 ENCOUNTER — TREATMENT (OUTPATIENT)
Dept: PHYSICAL THERAPY | Facility: CLINIC | Age: 64
End: 2024-03-04
Payer: MEDICARE

## 2024-03-04 DIAGNOSIS — M17.12 UNILATERAL PRIMARY OSTEOARTHRITIS, LEFT KNEE: Primary | ICD-10-CM

## 2024-03-04 PROCEDURE — 97110 THERAPEUTIC EXERCISES: CPT | Mod: GP

## 2024-03-04 NOTE — PROGRESS NOTES
"Physical Therapy Treatment    Patient Name: Vinicius Gallagher  MRN: [id number]  Time Calculation  Start Time: 1014  Stop Time: 1055  Time Calculation (min): 41 min     PT Therapeutic Procedures Time Entry  Therapeutic Exercise Time Entry: 41                   Current Problem  1. Unilateral primary osteoarthritis, left knee        Insurance reviewed   Visit number: 3  Approved number of visits: BMN   [facility] MEDICARE BMN     Subjective   General  Pt reports feeling pretty sore today, reports that he has been doing a lot of walking and went shopping for furniture over the weekend. Exercises are going well at home. Pt is only using SPC for mobility at this time as well.   Precautions  S/p L TKR 2/12/24  Pain  6/10 L knee, aching/soreness    Objective   L knee AROM: 105*  L knee AAROM: 114*    Treatments:  CONRADO: 6'  Supine heel slides w/ strap: 5\"x20  Supine HS/ITB stretches w/ strap: 30\"x2  Supine quad stretch w/ strap: 30\"x2  SLR flex/abd: 2x10 each  Bridges: 2x10  S/L clamshells: 2x10  LAQ w/ ball squeeze: 2-3\" 2x10  Step ups F/L: 4\" 2x10 each    OP EDUCATION/HEP:  Access Code: [email or web address]  URL: [email or web address]  Date: 03/04/2024  Prepared by: Racheal Wong    Exercises  - Supine Heel Slide with Strap  - 1 x daily - 7 x weekly - 2 sets - 10 reps - 5 sec hold  - Hooklying Hamstring Stretch with Strap  - 1 x daily - 7 x weekly - 3 sets - 30 sec hold  - Supine ITB Stretch with Strap  - 1 x daily - 7 x weekly - 3 sets - 30 sec hold  - Supine Quadriceps Stretch with Strap on Table  - 1 x daily - 7 x weekly - 3 sets - 30 sec hold  - Sidelying Hip Abduction  - 1 x daily - 7 x weekly - 2 sets - 10 reps  - Seated Long Arc Quad with Hip Adduction  - 1 x daily - 7 x weekly - 2 sets - 10 reps - 2-3 sec hold    Assessment   Able to add several new exercises this date to continue to improve L knee ROM and strength. Several new stretches added to decrease tightness and stiffness. Introduced step ups, LAQ, and s/l " hip abd for further strengthening. HEP updated with new exercises. Pt has no c/o pain w/ all exercises, only muscle fatigue as expected. PT to continue to address pain, ROM/tissue mobility, strength, gait and posture to facilitate return to prior level of activities as able.     Plan   Progress w/ POC, as tolerated

## 2024-03-07 ENCOUNTER — TREATMENT (OUTPATIENT)
Dept: PHYSICAL THERAPY | Facility: CLINIC | Age: 64
End: 2024-03-07
Payer: MEDICARE

## 2024-03-07 DIAGNOSIS — M17.12 UNILATERAL PRIMARY OSTEOARTHRITIS, LEFT KNEE: Primary | ICD-10-CM

## 2024-03-07 PROCEDURE — 97110 THERAPEUTIC EXERCISES: CPT | Mod: GP,CQ

## 2024-03-07 ASSESSMENT — PAIN SCALES - GENERAL: PAINLEVEL_OUTOF10: 3

## 2024-03-07 ASSESSMENT — PAIN - FUNCTIONAL ASSESSMENT: PAIN_FUNCTIONAL_ASSESSMENT: 0-10

## 2024-03-07 NOTE — PROGRESS NOTES
"Physical Therapy Treatment    Patient Name: Vinicius Gallagher  MRN: [id number]  Today's Date: 3/7/2024  Time Calculation  Start Time: 0953  Stop Time: 1040  Time Calculation (min): 47 min  PT Therapeutic Procedures Time Entry  Therapeutic Exercise Time Entry: 45       Current Problem  1. Unilateral primary osteoarthritis, left knee            Subjective   General   Pt stating soreness continues, but denies much in the way of pain today.  Still has quite a bit difficulty falling/staying asleep.   Insurance   Visit number: 4  Approved number of visits: BMN [employer] MEDICARE BMN   Pain  Pain Assessment: 0-10  Pain Score: 3    Objective   L knee flexion AAROM 112  Treatments:  Matt 8 min  Heel slides 10\"x10  QS 5\" x20  SLR flex/AB 2x10  Bridges 2x10  Clamshells 2x10  LAQ 2x10  Squats 2x10  HR/TR 2x10  SLS   Standing 3-way x15  Step ups f/l   Step overs     Assessment   Pt ambulating with antalgic gait pattern and SPC.  Continued with strengthening exercises with normal muscle fatigue noted.  States some tightening anterior knee as treatment progressed.  Progressed with introduction several hip and quad strengthening exercises. Encouraged pt continue icing at home. Reviewed HEP.  Plan:  Progress with POC as tolerated.    OP EDUCATION:       Goals:     "

## 2024-03-11 ENCOUNTER — TREATMENT (OUTPATIENT)
Dept: PHYSICAL THERAPY | Facility: CLINIC | Age: 64
End: 2024-03-11
Payer: MEDICARE

## 2024-03-11 DIAGNOSIS — M17.12 UNILATERAL PRIMARY OSTEOARTHRITIS, LEFT KNEE: Primary | ICD-10-CM

## 2024-03-11 PROCEDURE — 97110 THERAPEUTIC EXERCISES: CPT | Mod: GP

## 2024-03-11 NOTE — PROGRESS NOTES
"Physical Therapy Treatment    Patient Name: Vinicius Gallagher  MRN: [id number]  Time Calculation  Start Time: 0300  Stop Time: 0344  Time Calculation (min): 44 min     PT Therapeutic Procedures Time Entry  Therapeutic Exercise Time Entry: 44                   Current Problem  1. Unilateral primary osteoarthritis, left knee        Insurance   Visit number: 5  Approved number of visits: BMN   [facility] MEDICARE BMN     Subjective   General  Pt reports feeling pretty good today. Just a little stiffness and soreness, but otherwise doing well. No longer using cane for ambulation.  Precautions  none  Pain  2/10, stiffness    Objective   L knee flexion AAROM 115  Treatments:  CONRADO: 8 min  Heel slides 10\"x10  SLR flex/abd: 1.5# 2x10 each  Bridges: RTB 2x10  Clamshells: RTB 2x10  Step-ups F/L: 6\"2x10 each  Step overs: 4\" x10  Lateral tap downs: 4\" 2x10  Standing 3-way hip YTB x15 each B    OP EDUCATION/HEP:      Assessment   Pt continues to demo improved L knee ROM this date. Able to increase reps/resistance/difficulty for various exercises this date. Added lateral tap downs for further quad strengthening and control. Pt has no c/o pain w/ all exercises this date, only muscle fatigue as expected. PT to continue to address pain, ROM/tissue mobility, improve strength, gait and balance to facilitate return to prior level of activities as able.     Plan   Continue to progress w/ POC, as tolerated.   "

## 2024-03-12 ENCOUNTER — OFFICE VISIT (OUTPATIENT)
Dept: PULMONOLOGY | Age: 64
End: 2024-03-12
Payer: MEDICARE

## 2024-03-12 VITALS
SYSTOLIC BLOOD PRESSURE: 134 MMHG | DIASTOLIC BLOOD PRESSURE: 76 MMHG | BODY MASS INDEX: 31.6 KG/M2 | WEIGHT: 233 LBS | OXYGEN SATURATION: 98 % | HEART RATE: 84 BPM

## 2024-03-12 DIAGNOSIS — G47.33 OSA ON CPAP: ICD-10-CM

## 2024-03-12 DIAGNOSIS — E66.9 OBESITY (BMI 30-39.9): Primary | ICD-10-CM

## 2024-03-12 PROCEDURE — G8484 FLU IMMUNIZE NO ADMIN: HCPCS | Performed by: INTERNAL MEDICINE

## 2024-03-12 PROCEDURE — G8427 DOCREV CUR MEDS BY ELIG CLIN: HCPCS | Performed by: INTERNAL MEDICINE

## 2024-03-12 PROCEDURE — 1123F ACP DISCUSS/DSCN MKR DOCD: CPT | Performed by: INTERNAL MEDICINE

## 2024-03-12 PROCEDURE — 1036F TOBACCO NON-USER: CPT | Performed by: INTERNAL MEDICINE

## 2024-03-12 PROCEDURE — G8417 CALC BMI ABV UP PARAM F/U: HCPCS | Performed by: INTERNAL MEDICINE

## 2024-03-12 PROCEDURE — 3017F COLORECTAL CA SCREEN DOC REV: CPT | Performed by: INTERNAL MEDICINE

## 2024-03-12 PROCEDURE — 99214 OFFICE O/P EST MOD 30 MIN: CPT | Performed by: INTERNAL MEDICINE

## 2024-03-12 ASSESSMENT — ENCOUNTER SYMPTOMS
EYE ITCHING: 0
SORE THROAT: 0
CHEST TIGHTNESS: 0
RHINORRHEA: 0
VOICE CHANGE: 0
COUGH: 0
SHORTNESS OF BREATH: 0
DIARRHEA: 0
ABDOMINAL PAIN: 0
VOMITING: 0
NAUSEA: 0
WHEEZING: 0

## 2024-03-12 NOTE — PROGRESS NOTES
in compliance report      I reviewed compliance report with patient regarding CPAP therapy. He is using  CPAP for 30 days out of 30 days.  Average usage of days used is 8 hours and 12  min , average AHI 24.6 with CPAP use.     - Respiratory Therapy Supplies PANDA; New CPAP mask and supplies  Dispense: 2 each; Refill: 0    Counseling: CPAP/BiPAP uses, He advised to use CPAP at least 5-6 hours every night.    Driving: He is advised for extreme caution when driving or operating machinery if there is a feeling of drowsiness, especially while driving it is preferable to stop driving and take a brief nap.  Sleep hygiene:Avoid supine sleep, sleep on  sides. Avoid  sleep deprivation.  Explained sleep hygiene.  Advice to avoid Alcohol and sedative    Time spend over 30 min. Face to face.with greater than 50 % time with CPAP therapy including review compliance, counseling and advised regarding CPAP therapy.       2. Obesity (BMI 30-39.9)  He is advised try to lose weight. obesity related risk explained to the patient ,  Current weight:  105.7 kg (233 lb) Lbs. BMI:  Body mass index is 31.6 kg/m².  Suggested weight control approaches, including dietary changes , exercise, behavioral modification.        Return in about 3 months (around 6/12/2024) for leonel.      Damion Moore MD

## 2024-03-15 ENCOUNTER — TREATMENT (OUTPATIENT)
Dept: PHYSICAL THERAPY | Facility: CLINIC | Age: 64
End: 2024-03-15
Payer: MEDICARE

## 2024-03-15 DIAGNOSIS — R55 NEAR SYNCOPE: Primary | ICD-10-CM

## 2024-03-15 DIAGNOSIS — M17.12 UNILATERAL PRIMARY OSTEOARTHRITIS, LEFT KNEE: Primary | ICD-10-CM

## 2024-03-15 PROCEDURE — 97140 MANUAL THERAPY 1/> REGIONS: CPT | Mod: GP,CQ

## 2024-03-15 PROCEDURE — 97110 THERAPEUTIC EXERCISES: CPT | Mod: GP,CQ

## 2024-03-15 ASSESSMENT — PAIN - FUNCTIONAL ASSESSMENT: PAIN_FUNCTIONAL_ASSESSMENT: 0-10

## 2024-03-15 ASSESSMENT — PAIN SCALES - GENERAL: PAINLEVEL_OUTOF10: 0 - NO PAIN

## 2024-03-15 NOTE — PROGRESS NOTES
"Physical Therapy Treatment    Patient Name: Vinicius Gallagher  MRN: [id number]  Today's Date: 3/15/2024  Time Calculation  Start Time: 0915  Stop Time: 1001  Time Calculation (min): 46 min  PT Therapeutic Procedures Time Entry  Manual Therapy Time Entry: 10  Therapeutic Exercise Time Entry: 36       Current Problem  1. Unilateral primary osteoarthritis, left knee            Subjective   General   \"I was really sore after Monday so I didn't do anything on Tuesday.\"  Insurance   Visit number: 6  Approved number of visits: [facility] MEDICARE [facility]    Pain  Pain Assessment: 0-10  Pain Score: 0 - No pain  Objective   L knee flex AAROM 116  Treatments:  CONRADO: 8 min  Heel slides 10\"x10  SLR flex/abd: 1.5# 2x10 each  Bridges: RTB 2x10  Clamshells: RTB 2x10  Step-ups F/L: 6\"2x10 each  8\" NV  Step overs: 4\" x10  Lateral tap downs: 4\" 2x10  Standing 3-way hip YTB x15 each B  Manual Therapy/ Scar massage  Assessment   Patient is progressing toward goals and completed therex without increasing pain. Treatment limited by muscle fatigue. Patient was challenged by forward tap downs from 4' step. Good carryover with sequencing and quad control from last treatment session. Will benefit from continued skilled therapy to address ROM and strength deficits. Manual therapy performed for soft tissue mobilization and scar management.     Treatment performed and documented by Sandy TAVARES  Treatment supervised by Edson Tiwari, [id number]    Plan:  Progress with POC as tolerated.     OP EDUCATION:   Patient educated on scar management.     Goals:           "

## 2024-03-15 NOTE — PROGRESS NOTES
Treatment note entered in error. Duplicate note, original signed and cosigned by supervising PTA and evaluating PT.

## 2024-03-16 RX ORDER — HYOSCYAMINE SULFATE 0.12 MG/1
0.12 TABLET, ORALLY DISINTEGRATING ORAL DAILY
Qty: 30 TABLET | Refills: 3 | Status: SHIPPED | OUTPATIENT
Start: 2024-03-16 | End: 2024-03-20 | Stop reason: SDUPTHER

## 2024-03-18 ENCOUNTER — TREATMENT (OUTPATIENT)
Dept: PHYSICAL THERAPY | Facility: CLINIC | Age: 64
End: 2024-03-18
Payer: MEDICARE

## 2024-03-18 DIAGNOSIS — M17.12 UNILATERAL PRIMARY OSTEOARTHRITIS, LEFT KNEE: Primary | ICD-10-CM

## 2024-03-18 PROCEDURE — 97110 THERAPEUTIC EXERCISES: CPT | Mod: GP,CQ

## 2024-03-18 NOTE — PROGRESS NOTES
"Physical Therapy Treatment    Patient Name: Vinicius Gallagher  MRN: [id number]  Today's Date: 3/18/2024  Time Calculation  Start Time: 1445  Stop Time: 1530  Time Calculation (min): 45 min  PT Therapeutic Procedures Time Entry  Manual Therapy Time Entry: 5  Therapeutic Exercise Time Entry: 38       Current Problem  1. Unilateral primary osteoarthritis, left knee            Subjective   General   \"I get a sharp pain in the side of my knee sometimes.\" \"I don't have any pain but it feels a little stiff.\"    Insurance   Visit number: 7  Approved number of visits: BMN   [facility] MEDICARE BMN  Precautions     Pain   0/10    Objective   Treatments:  CONRADO: 8 min  Heel slides 10\"x10 NT  SLR flex/abd: 1.5# 2x10 each  Bridges: RTB 2x10  Clamshells: RTB 2x10  Step-ups F/L: 8\"2x10  Step overs: 4\" x10  Lateral/Forward tap downs: 4\" 2x10/1x10  Standing 3-way hip YTB x15 each   Toe Taps 8\" R/L 15x2  Leg Press # NV  Wall squats NV  SLS NV      Manual Therapy/ Scar massage  Assessment   Patient circumvents LLE during flexion for stair stepping. Demonstrates good body awareness and is able to self correct. Good muscular control throughout session with minimal cueing for sequencing. Will add leg press, wall squats and balance activities next visit.     Plan:  Progress with POC as tolerated.    OP EDUCATION:       Goals:     "

## 2024-03-19 DIAGNOSIS — R55 NEAR SYNCOPE: ICD-10-CM

## 2024-03-19 NOTE — TELEPHONE ENCOUNTER
Rx Refill Request Telephone Encounter    Name:  Vinicius Gallagher  :  [id number]  Medication Name:     hyoscyamine 0.125 mg disintegrating tablet     Specific Pharmacy location:    [facility] - [address] Phone: [phone]   Fax: [phone]          Date of last appointment:  2023  Date of next appointment:  10/30/2024

## 2024-03-20 ENCOUNTER — OFFICE VISIT (OUTPATIENT)
Dept: ORTHOPEDIC SURGERY | Facility: CLINIC | Age: 64
End: 2024-03-20
Payer: MEDICARE

## 2024-03-20 ENCOUNTER — TREATMENT (OUTPATIENT)
Dept: PHYSICAL THERAPY | Facility: CLINIC | Age: 64
End: 2024-03-20
Payer: MEDICARE

## 2024-03-20 DIAGNOSIS — Z96.652 AFTERCARE FOLLOWING LEFT KNEE JOINT REPLACEMENT SURGERY: Primary | ICD-10-CM

## 2024-03-20 DIAGNOSIS — Z47.1 AFTERCARE FOLLOWING LEFT KNEE JOINT REPLACEMENT SURGERY: Primary | ICD-10-CM

## 2024-03-20 PROCEDURE — 1157F ADVNC CARE PLAN IN RCRD: CPT | Performed by: ORTHOPAEDIC SURGERY

## 2024-03-20 PROCEDURE — 99024 POSTOP FOLLOW-UP VISIT: CPT | Performed by: ORTHOPAEDIC SURGERY

## 2024-03-20 PROCEDURE — 1036F TOBACCO NON-USER: CPT | Performed by: ORTHOPAEDIC SURGERY

## 2024-03-20 PROCEDURE — 97110 THERAPEUTIC EXERCISES: CPT | Mod: GP,CQ

## 2024-03-20 PROCEDURE — 1159F MED LIST DOCD IN RCRD: CPT | Performed by: ORTHOPAEDIC SURGERY

## 2024-03-20 RX ORDER — HYOSCYAMINE SULFATE 0.12 MG/1
0.12 TABLET, ORALLY DISINTEGRATING ORAL DAILY
Qty: 90 TABLET | Refills: 3 | Status: SHIPPED | OUTPATIENT
Start: 2024-03-20

## 2024-03-20 NOTE — PROGRESS NOTES
History of present illness    64-year-old male here for follow-up left total knee replacement surgery February 12 states doing very well he is no longer taking the pain medication no longer taking the muscle relaxer he is doing his outpatient physical therapy is ambulating without assistive device.  No fevers or chills no numbness or tingling no redness or drainage.      Past medical , Surgical, Family and social history reviewed.      Physical exam  General: No acute distress and breathing comfortably.  Patient is pleasant and cooperative with the examination.    Extremity  Knee incision well-approximated small scab along the proximal aspect no signs of secondary infection excellent range of motion with full knee extension flexion 115 degrees and brisk cap refill compartments soft calf is nontender    Diagnostics      XR knee left 3 views    Result Date: 2/28/2024  Interpreted By:  Antonio De Luna, STUDY: <Procedure Description>;  <Laterality>;  <Completed Time>   INDICATION: <Reason For Exam>.   ACCESSION NUMBER(S): <Accession Number>   ORDERING CLINICIAN: <Ordering Clinician>   FINDINGS: Left knee three views. Post total knee replacement components in good position no signs of fracture dislocation or other bony abnormality       Signed by: Antonio De Luna 2/28/2024 9:29 AM Dictation workstation:   [id number]       Procedure  [ none]    Assessment  Status post total knee replacement    Treatment plan  1.  Continue with his physical therapy continue work on range of motion strengthening follow-up with me in 6 weeks repeat evaluation with x-ray at that time sooner if he has increased pain or discomfort.  2. [   ]  3. [   ]  4.  All of the patient's questions were answered.    No orders of the defined types were placed in this encounter.      This note was prepared using voice recognition software.  The details of this note are correct and have been reviewed, and corrected to the best of my  ability.  Some grammatical areas may persist related to the Dragon software    Antonio De Luna MD  Senior Attending Physician  [facility]  [facility] [place]    [phone]

## 2024-03-20 NOTE — PROGRESS NOTES
"Physical Therapy Treatment    Patient Name: Vinicius Gallagher  MRN: [id number]  Today's Date: 3/20/2024  Time Calculation  Start Time: 1000  Stop Time: 1045  Time Calculation (min): 45 min        Insurance   Visit number: 8  Approved number of visits: N   [facility] MEDICARE BMN    Current Problem  1. Unilateral primary osteoarthritis, left knee        Subjective   General  \"No pain.\"    Patient is feeling good with no complaints of pain. Is using ice    Precautions     Pain   0/10    Objective   Treatments:     CONRADO: 8 min  Heel slides 10\"x10 NT  SLR flex/abd: 1.5# 2x10 each NT  Bridges: RTB 2x10 NT  Clamshells: RTB 2x10 NT  Step-ups F/L: 8\"2x10  Step overs: 4\" 10x2  Lateral/Forward tap downs: 4\" 2x10/1x10  Standing 3-way hip YTB x15 each  GTB 3/20/24  Toe Taps 8\" R/L 15x2  Leg Press # 120# 140# 10x  Wall squats 10x2  SLS 10\" x 10    Assessment:   Patient is highly motivated and is progressing by completing additional exercises. Will continue to work on eccentric control of the L quad as this challenging. AAROM same as last session at 116. Great carryover from last treatment session and demonstrates improved awareness of movement patterns. Address balance deficits next visit.     Plan:   Continue to progress as tolerated    OP EDUCATION:   Use of Ice and scar management     Goals:    Treatment performed by TIMOTHY Pretty  Supervised by William Wang PTA     "

## 2024-03-25 ENCOUNTER — TREATMENT (OUTPATIENT)
Dept: PHYSICAL THERAPY | Facility: CLINIC | Age: 64
End: 2024-03-25
Payer: MEDICARE

## 2024-03-25 DIAGNOSIS — M17.12 UNILATERAL PRIMARY OSTEOARTHRITIS, LEFT KNEE: Primary | ICD-10-CM

## 2024-03-25 PROCEDURE — 97110 THERAPEUTIC EXERCISES: CPT | Mod: GP,CQ

## 2024-03-25 NOTE — PROGRESS NOTES
"Physical Therapy Treatment    Patient Name: Vinicius Gallagher  MRN: [id number]  Today's Date: 3/25/2024  Time Calculation  Start Time: 1447  Stop Time: 1530  Time Calculation (min): 43 min  PT Therapeutic Procedures Time Entry  Therapeutic Exercise Time Entry: 40        Insurance   Visit number: 9  Approved number of visits: BMN   [facility] MEDICARE BMN    Current Problem  1. Unilateral primary osteoarthritis, left knee            Subjective   General   Pt. Reports he is feeling great and can go up/down his stairs reciprocally.   Precautions     Pain   0/10    Objective   L knee ROM: 0* - 120* AAROM    Flex: 4/5  Ext:4+/5    LEFS: 65/80    Treatments:   CONRADO: 8 min  Heel slides 10\"x10  Step-ups F/L: 8\"2x10  Step overs: 8\" 10x2  Lateral/Forward tap downs: 6\" 2x10  Standing 3-way hip on [employer] Aire[employer] GTB 3/20/24  Leg Press # 140# 160# 10x2  Leg Press SL 80# 60# 10 x 2  Wall squats 10x2 NT  SLS 10\" x 10    Assessment:   Patient has progressed with Increased AAROM L knee flexion from 116 to 120. Patient able to tolerate increased weight, reps and step height for exercises. Pt. Feels like he can continue w/ all of his exercises at home from this point on and will like to be done w/ therapy. Pt. Strength improved to 4/5 flexion and 4+/5 extension w/o pain. Pt. LEFS scored at 65/80 today. Pt. Will be DC'd today and will call if he has any questions.       Treatment provided by Sandy TAVARES  Supervised by William Wang PTA    Plan:   Continue w/ current POC.     OP EDUCATION:       Goals:     "

## 2024-03-28 ENCOUNTER — APPOINTMENT (OUTPATIENT)
Dept: PHYSICAL THERAPY | Facility: CLINIC | Age: 64
End: 2024-03-28
Payer: MEDICARE

## 2024-04-02 ENCOUNTER — APPOINTMENT (OUTPATIENT)
Dept: PHYSICAL THERAPY | Facility: CLINIC | Age: 64
End: 2024-04-02
Payer: MEDICARE

## 2024-05-01 ENCOUNTER — HOSPITAL ENCOUNTER (OUTPATIENT)
Dept: RADIOLOGY | Facility: CLINIC | Age: 64
Discharge: HOME | End: 2024-05-01
Payer: MEDICARE

## 2024-05-01 ENCOUNTER — OFFICE VISIT (OUTPATIENT)
Dept: ORTHOPEDIC SURGERY | Facility: CLINIC | Age: 64
End: 2024-05-01
Payer: MEDICARE

## 2024-05-01 DIAGNOSIS — Z96.652 AFTERCARE FOLLOWING LEFT KNEE JOINT REPLACEMENT SURGERY: ICD-10-CM

## 2024-05-01 DIAGNOSIS — Z47.1 AFTERCARE FOLLOWING LEFT KNEE JOINT REPLACEMENT SURGERY: ICD-10-CM

## 2024-05-01 DIAGNOSIS — Z47.1 AFTERCARE FOLLOWING LEFT KNEE JOINT REPLACEMENT SURGERY: Primary | ICD-10-CM

## 2024-05-01 DIAGNOSIS — Z96.652 AFTERCARE FOLLOWING LEFT KNEE JOINT REPLACEMENT SURGERY: Primary | ICD-10-CM

## 2024-05-01 PROCEDURE — 99024 POSTOP FOLLOW-UP VISIT: CPT | Performed by: ORTHOPAEDIC SURGERY

## 2024-05-01 PROCEDURE — 73562 X-RAY EXAM OF KNEE 3: CPT | Mod: LT

## 2024-05-01 PROCEDURE — 73562 X-RAY EXAM OF KNEE 3: CPT | Mod: LEFT SIDE | Performed by: ORTHOPAEDIC SURGERY

## 2024-05-01 PROCEDURE — 1159F MED LIST DOCD IN RCRD: CPT | Performed by: ORTHOPAEDIC SURGERY

## 2024-05-01 PROCEDURE — 1157F ADVNC CARE PLAN IN RCRD: CPT | Performed by: ORTHOPAEDIC SURGERY

## 2024-05-01 RX ORDER — AMOXICILLIN 500 MG/1
CAPSULE ORAL
Qty: 4 CAPSULE | Refills: 5 | Status: SHIPPED | OUTPATIENT
Start: 2024-05-01 | End: 2024-05-06 | Stop reason: SDUPTHER

## 2024-05-01 NOTE — PROGRESS NOTES
Subjective    Patient ID: Vinicius    Chief Complaint:   Chief Complaint   Patient presents with    Left Knee - Follow-up     LT AYANNA RUGGIERO 2/12/24, almost 3 months out, with x-rays today     History of present illness    64-year-old gentleman presented clinic today for his 3-month postop visit status post left total knee Denny.  He is doing much better at this time.  He is doing all of his activities in the yard and around the house with having only minimal swelling left knee.  Still gets some mild tightness over the IT band.  Seems to be recovering fairly well.  He is very happy with the outcome.  No instability reported.  Still mild numbness over the anterior lateral aspect left knee.      Past medical , Surgical, Family and social history reviewed.      Physical exam  General: No acute distress and breathing comfortably.  Patient is pleasant and cooperative with the examination.    Extremity  Left knee is neurovascular intact.  Range of motion 0 to 115 degrees.  Mild edema left knee.  Tightness over the IT band incision is well-healed approximate without abnormality or infection.  No calf swelling or tenderness.  Compartment soft.  Capillary refill within normal is distally.  Improved strength left lower extremity.    Diagnostics  [ none]  XR knee left 3 views    Result Date: 5/1/2024  Interpreted By:  Antonio De Luna, STUDY: XR KNEE LEFT 3 VIEWS; 5/1/2024 8:50 am   INDICATION: Signs/Symptoms:s/p TKR.   ACCESSION NUMBER(S): [id number]   ORDERING CLINICIAN: ANTONIO DE LUNA   FINDINGS: Three views of the knee show status post joint replacement in good alignment.  There are no signs of fracture or dislocation.  No other bony abnormalities       Signed by: Antonio De Luna 5/1/2024 9:06 AM Dictation workstation:   [id number]       Procedure  [ none]    Assessment  Status post left total knee Denny    Treatment plan  1.  He will continue with weightbearing activity as tolerated  2.  Continue with home exercise  program.  We discussed importance of recovery techniques  3.  Follow-up with us in 3 months with new x-rays left knee sooner if he has any problems.  Prescription for antibiotic for dental prophylaxis sent to the pharmacy as well as implant card given for travel.  4.  All of the patient's questions were answered.    Orders Placed This Encounter    XR knee left 3 views       This note was prepared using voice recognition software.  The details of this note are correct and have been reviewed, and corrected to the best of my ability.  Some grammatical areas may persist related to the Dragon software    Otf Schmid PA-C, [facility]  [facility]  [facility] [place]    [phone]

## 2024-05-06 ENCOUNTER — TELEPHONE (OUTPATIENT)
Dept: ORTHOPEDIC SURGERY | Facility: CLINIC | Age: 64
End: 2024-05-06
Payer: MEDICARE

## 2024-05-06 DIAGNOSIS — Z47.1 AFTERCARE FOLLOWING LEFT KNEE JOINT REPLACEMENT SURGERY: ICD-10-CM

## 2024-05-06 DIAGNOSIS — Z96.652 AFTERCARE FOLLOWING LEFT KNEE JOINT REPLACEMENT SURGERY: ICD-10-CM

## 2024-05-06 RX ORDER — AMOXICILLIN 500 MG/1
CAPSULE ORAL
Qty: 4 CAPSULE | Refills: 5 | Status: SHIPPED | OUTPATIENT
Start: 2024-05-06

## 2024-05-06 NOTE — TELEPHONE ENCOUNTER
----- Message from Deirdre Fontenot sent at 5/6/2024  9:15 AM EDT -----  Regarding: wrong pharmacy  Patient said he gave you the wrong pharmacy. Told you [facility] and it should have been [facility] for his amoxicillin. Wants to know if you could please take care of for him. Thanks!    
Amoxicillin will be sent to pharmacy  
5

## 2024-05-29 ENCOUNTER — LAB (OUTPATIENT)
Dept: LAB | Facility: LAB | Age: 64
End: 2024-05-29
Payer: MEDICARE

## 2024-05-29 DIAGNOSIS — M10.48: ICD-10-CM

## 2024-05-29 DIAGNOSIS — R79.9 ABNORMAL FINDING OF BLOOD CHEMISTRY, UNSPECIFIED: ICD-10-CM

## 2024-05-29 DIAGNOSIS — E83.42 HYPOMAGNESEMIA: ICD-10-CM

## 2024-05-29 DIAGNOSIS — N18.2 CHRONIC KIDNEY DISEASE, STAGE 2 (MILD): Primary | ICD-10-CM

## 2024-05-29 DIAGNOSIS — E78.49 OTHER HYPERLIPIDEMIA: ICD-10-CM

## 2024-05-29 LAB
ALBUMIN SERPL BCP-MCNC: 4.3 G/DL (ref 3.4–5)
ALP SERPL-CCNC: 59 U/L (ref 33–136)
ALT SERPL W P-5'-P-CCNC: 17 U/L (ref 10–52)
ANION GAP SERPL CALC-SCNC: 10 MMOL/L (ref 10–20)
AST SERPL W P-5'-P-CCNC: 18 U/L (ref 9–39)
BASOPHILS # BLD AUTO: 0.05 X10*3/UL (ref 0–0.1)
BASOPHILS NFR BLD AUTO: 0.8 %
BILIRUB SERPL-MCNC: 0.8 MG/DL (ref 0–1.2)
BUN SERPL-MCNC: 30 MG/DL (ref 6–23)
CALCIUM SERPL-MCNC: 9.2 MG/DL (ref 8.6–10.3)
CHLORIDE SERPL-SCNC: 110 MMOL/L (ref 98–107)
CHOLEST SERPL-MCNC: 170 MG/DL (ref 0–199)
CHOLESTEROL/HDL RATIO: 2.7
CO2 SERPL-SCNC: 27 MMOL/L (ref 21–32)
CREAT SERPL-MCNC: 1.15 MG/DL (ref 0.5–1.3)
EGFRCR SERPLBLD CKD-EPI 2021: 71 ML/MIN/1.73M*2
EOSINOPHIL # BLD AUTO: 0.32 X10*3/UL (ref 0–0.7)
EOSINOPHIL NFR BLD AUTO: 5 %
ERYTHROCYTE [DISTWIDTH] IN BLOOD BY AUTOMATED COUNT: 13.5 % (ref 11.5–14.5)
GLUCOSE SERPL-MCNC: 97 MG/DL (ref 74–99)
HCT VFR BLD AUTO: 43.3 % (ref 41–52)
HDLC SERPL-MCNC: 63.6 MG/DL
HGB BLD-MCNC: 14.1 G/DL (ref 13.5–17.5)
IMM GRANULOCYTES # BLD AUTO: 0.02 X10*3/UL (ref 0–0.7)
IMM GRANULOCYTES NFR BLD AUTO: 0.3 % (ref 0–0.9)
LDLC SERPL CALC-MCNC: 93 MG/DL
LYMPHOCYTES # BLD AUTO: 2.11 X10*3/UL (ref 1.2–4.8)
LYMPHOCYTES NFR BLD AUTO: 33.2 %
MAGNESIUM SERPL-MCNC: 2 MG/DL (ref 1.6–2.4)
MCH RBC QN AUTO: 31.3 PG (ref 26–34)
MCHC RBC AUTO-ENTMCNC: 32.6 G/DL (ref 32–36)
MCV RBC AUTO: 96 FL (ref 80–100)
MONOCYTES # BLD AUTO: 0.59 X10*3/UL (ref 0.1–1)
MONOCYTES NFR BLD AUTO: 9.3 %
NEUTROPHILS # BLD AUTO: 3.27 X10*3/UL (ref 1.2–7.7)
NEUTROPHILS NFR BLD AUTO: 51.4 %
NON HDL CHOLESTEROL: 106 MG/DL (ref 0–149)
NRBC BLD-RTO: 0 /100 WBCS (ref 0–0)
PLATELET # BLD AUTO: 168 X10*3/UL (ref 150–450)
POTASSIUM SERPL-SCNC: 4.8 MMOL/L (ref 3.5–5.3)
PROT SERPL-MCNC: 6.6 G/DL (ref 6.4–8.2)
RBC # BLD AUTO: 4.51 X10*6/UL (ref 4.5–5.9)
SODIUM SERPL-SCNC: 142 MMOL/L (ref 136–145)
TRIGL SERPL-MCNC: 66 MG/DL (ref 0–149)
URATE SERPL-MCNC: 6.3 MG/DL (ref 4–7.5)
VLDL: 13 MG/DL (ref 0–40)
WBC # BLD AUTO: 6.4 X10*3/UL (ref 4.4–11.3)

## 2024-05-29 PROCEDURE — 80053 COMPREHEN METABOLIC PANEL: CPT

## 2024-05-29 PROCEDURE — 85025 COMPLETE CBC W/AUTO DIFF WBC: CPT

## 2024-05-29 PROCEDURE — 83735 ASSAY OF MAGNESIUM: CPT

## 2024-05-29 PROCEDURE — 80061 LIPID PANEL: CPT

## 2024-05-29 PROCEDURE — 84550 ASSAY OF BLOOD/URIC ACID: CPT

## 2024-05-29 PROCEDURE — 36415 COLL VENOUS BLD VENIPUNCTURE: CPT

## 2024-07-03 ENCOUNTER — OFFICE VISIT (OUTPATIENT)
Dept: PULMONOLOGY | Age: 64
End: 2024-07-03
Payer: MEDICARE

## 2024-07-03 VITALS
HEART RATE: 70 BPM | WEIGHT: 232 LBS | OXYGEN SATURATION: 97 % | SYSTOLIC BLOOD PRESSURE: 130 MMHG | BODY MASS INDEX: 31.46 KG/M2 | DIASTOLIC BLOOD PRESSURE: 80 MMHG

## 2024-07-03 DIAGNOSIS — E66.9 OBESITY (BMI 30-39.9): ICD-10-CM

## 2024-07-03 DIAGNOSIS — G47.33 OSA ON CPAP: Primary | ICD-10-CM

## 2024-07-03 PROCEDURE — 99214 OFFICE O/P EST MOD 30 MIN: CPT | Performed by: INTERNAL MEDICINE

## 2024-07-03 PROCEDURE — 1123F ACP DISCUSS/DSCN MKR DOCD: CPT | Performed by: INTERNAL MEDICINE

## 2024-07-03 ASSESSMENT — ENCOUNTER SYMPTOMS
VOMITING: 0
SHORTNESS OF BREATH: 0
RHINORRHEA: 0
VOICE CHANGE: 0
COUGH: 0
NAUSEA: 0
WHEEZING: 0
ABDOMINAL PAIN: 0
DIARRHEA: 0
SORE THROAT: 0
CHEST TIGHTNESS: 0
EYE ITCHING: 0

## 2024-07-03 NOTE — PROGRESS NOTES
Cardiovascular:      Rate and Rhythm: Normal rate and regular rhythm.      Heart sounds: No murmur heard.     No friction rub. No gallop.   Pulmonary:      Effort: Pulmonary effort is normal. No respiratory distress.      Breath sounds: Normal breath sounds. No wheezing or rales.   Chest:      Chest wall: No tenderness.   Abdominal:      General: There is no distension.   Musculoskeletal:         General: Normal range of motion.   Lymphadenopathy:      Cervical: No cervical adenopathy.   Skin:     General: Skin is warm and dry.      Findings: No rash.   Neurological:      Mental Status: He is alert and oriented to person, place, and time.      Cranial Nerves: No cranial nerve deficit.   Psychiatric:         Behavior: Behavior normal.         Current Outpatient Medications   Medication Sig Dispense Refill    Respiratory Therapy Supplies PANDA New CPAP mask and supplies 2 each 0    celecoxib (CELEBREX) 200 MG capsule       famotidine (PEPCID) 20 MG tablet Take by mouth      NYAMYC 895424 UNIT/GM powder Apply 1 application to affected area twice daily.      hydroxychloroquine (PLAQUENIL) 200 MG tablet Take 1 tablet by mouth twice a day as directed      meloxicam (MOBIC) 15 MG tablet Take 1 tablet by mouth daily      fexofenadine (ALLEGRA) 180 MG tablet Take 1 tablet by mouth      CPAP Machine MISC by Does not apply route New CPAP at 9 cm      Dx RKIKI. 1 each 0    mometasone (ELOCON) 0.1 % cream       hyoscyamine (LEVSIN/SL) 125 MCG sublingual tablet       allopurinol (ZYLOPRIM) 100 MG tablet        No current facility-administered medications for this visit.         Assessment/Plan:     1. RIKKI on CPAP  He is using CPAP with  9  centimeters of H2O with heated humidity for about  8 hours every night.  He is using CPAP with  Full face Mask.he said he is doing better with less humidity .He said  sleep is restful with the CPAP use. He is compliant with CPAP therapy and benefiting with CPAP use. No need for CPAP supply

## 2024-07-30 DIAGNOSIS — R55 NEAR SYNCOPE: ICD-10-CM

## 2024-07-30 NOTE — TELEPHONE ENCOUNTER
Rx Refill Request Telephone Encounter    Name:  Vinicius Gallagher  :  [id number]  Medication Name:       hyoscyamine 0.125 mg disintegrating tablet       Specific Pharmacy location:    [facility] - [address] Phone: [phone]   Fax: [phone]        Date of last appointment:  2023    Date of next appointment:  10/30/24

## 2024-07-31 RX ORDER — HYOSCYAMINE SULFATE 0.12 MG/1
0.12 TABLET, ORALLY DISINTEGRATING ORAL DAILY
Qty: 90 TABLET | Refills: 3 | Status: SHIPPED | OUTPATIENT
Start: 2024-07-31

## 2024-08-06 ENCOUNTER — TELEPHONE (OUTPATIENT)
Dept: PRIMARY CARE | Facility: CLINIC | Age: 64
End: 2024-08-06

## 2024-08-06 ENCOUNTER — OFFICE VISIT (OUTPATIENT)
Dept: ORTHOPEDIC SURGERY | Facility: CLINIC | Age: 64
End: 2024-08-06
Payer: MEDICARE

## 2024-08-06 ENCOUNTER — HOSPITAL ENCOUNTER (OUTPATIENT)
Dept: RADIOLOGY | Facility: CLINIC | Age: 64
Discharge: HOME | End: 2024-08-06
Payer: MEDICARE

## 2024-08-06 DIAGNOSIS — M17.12 PRIMARY OSTEOARTHRITIS OF LEFT KNEE: ICD-10-CM

## 2024-08-06 DIAGNOSIS — M17.12 PRIMARY OSTEOARTHRITIS OF LEFT KNEE: Primary | ICD-10-CM

## 2024-08-06 PROCEDURE — 99213 OFFICE O/P EST LOW 20 MIN: CPT | Performed by: ORTHOPAEDIC SURGERY

## 2024-08-06 PROCEDURE — 1157F ADVNC CARE PLAN IN RCRD: CPT | Performed by: ORTHOPAEDIC SURGERY

## 2024-08-06 PROCEDURE — 73562 X-RAY EXAM OF KNEE 3: CPT | Mod: LEFT SIDE | Performed by: ORTHOPAEDIC SURGERY

## 2024-08-06 PROCEDURE — 1160F RVW MEDS BY RX/DR IN RCRD: CPT | Performed by: ORTHOPAEDIC SURGERY

## 2024-08-06 PROCEDURE — 1159F MED LIST DOCD IN RCRD: CPT | Performed by: ORTHOPAEDIC SURGERY

## 2024-08-06 PROCEDURE — 73562 X-RAY EXAM OF KNEE 3: CPT | Mod: LT

## 2024-08-06 PROCEDURE — 1036F TOBACCO NON-USER: CPT | Performed by: ORTHOPAEDIC SURGERY

## 2024-08-06 NOTE — PROGRESS NOTES
History of present illness    64-year-old gentleman here for follow-up of his left total knee replacement from February states he doing very well ambulating without assist device no fevers or chills still has a little bit stiffness no numbness or tingling no fevers or chills no locking or giving way very happy with his progress      Past medical , Surgical, Family and social history reviewed.      Physical exam  General: No acute distress and breathing comfortably.  Patient is pleasant and cooperative with the examination.    Extremity  Incision well-healed no's infection good range of motion no instability full knee extension flexion 115 degrees brisk cap refill compartment soft calves nontender    Diagnostics      XR knee left 3 views    Result Date: 8/6/2024  Interpreted By:  Mateo De Lnua, STUDY: XR KNEE LEFT 3 VIEWS; 8/6/2024 8:25 am   INDICATION: Signs/Symptoms:Pain.   ACCESSION NUMBER(S): [id number]   ORDERING CLINICIAN: MATEO DE LUNA   FINDINGS: Three views of the knee show status post joint replacement in good alignment.  There are no signs of fracture or dislocation.  No other bony abnormalities       Signed by: Mateo De Luna 8/6/2024 8:33 AM Dictation workstation:   [id number]       Procedure  [ none]    Assessment  Status post total knee replacement left    Treatment plan  1.  Continue work on range of motion and strengthening continue to weight-bear as tolerated follow-up with me 6 months repeat evaluation with x-rays sooner if has increased pain or discomfort.  2. [   ]  3. [   ]  4.  All of the patient's questions were answered.    Orders Placed This Encounter    XR knee left 3 views       This note was prepared using voice recognition software.  The details of this note are correct and have been reviewed, and corrected to the best of my ability.  Some grammatical areas may persist related to the Dragon software    Mateo De Luna MD  Senior Attending  [facility]  Orthopedic [place]    [phone]

## 2024-08-06 NOTE — TELEPHONE ENCOUNTER
Pt left vm stating hyoscyamine 0.125mg is too expensive and they are recommending pt go on previous med he was on.

## 2024-08-16 ENCOUNTER — TELEPHONE (OUTPATIENT)
Dept: CARDIOLOGY | Facility: CLINIC | Age: 64
End: 2024-08-16

## 2024-08-16 NOTE — TELEPHONE ENCOUNTER
T/c to patient, patient stated was informed he will need a PA for Hyoscyamine.  Will start for  patient

## 2024-08-27 DIAGNOSIS — R55 NEAR SYNCOPE: ICD-10-CM

## 2024-08-27 RX ORDER — HYOSCYAMINE SULFATE 0.12 MG/1
0.12 TABLET, ORALLY DISINTEGRATING ORAL DAILY
Qty: 30 TABLET | Refills: 0 | Status: SHIPPED | OUTPATIENT
Start: 2024-08-27

## 2024-08-27 NOTE — TELEPHONE ENCOUNTER
T/c to patient. Informed him still no response on appeal that was faxed on 8/20/24. Informed patient will fax again

## 2024-08-27 NOTE — TELEPHONE ENCOUNTER
Patient asked if there was any update regarding this appeal.    Please call him when we hear anything . Thanks

## 2024-08-27 NOTE — TELEPHONE ENCOUNTER
Rx Refill Request Telephone Encounter    Name:  Vinicius Gallagher  :  [id number]  Medication Name:     hyoscyamine 0.125 mg disintegrating tablet   30 day supply      Specific Pharmacy location:    [email or web address] [address] [place] [address] Phone: [phone]   Fax: [phone]        Date of last appointment:  23  Date of next appointment:  10/30/24

## 2024-09-17 ENCOUNTER — TELEPHONE (OUTPATIENT)
Dept: CARDIOLOGY | Facility: CLINIC | Age: 64
End: 2024-09-17
Payer: MEDICARE

## 2024-09-17 NOTE — TELEPHONE ENCOUNTER
Called and spoke with someone at [facility] to check appeal status and was informed they do not have an appeal started on patient. I had originally faxed two appeals, however; was just informed they do not accept faxed appeals and will need to mail it to:   [address] [id number]   [place] [address].  Will mail appeal     Notified patient via "[employer]"

## 2024-09-20 DIAGNOSIS — R55 NEAR SYNCOPE: ICD-10-CM

## 2024-09-20 RX ORDER — HYOSCYAMINE SULFATE 0.12 MG/1
0.12 TABLET, ORALLY DISINTEGRATING ORAL DAILY
Qty: 30 TABLET | Refills: 1 | Status: SHIPPED | OUTPATIENT
Start: 2024-09-20

## 2024-09-20 NOTE — TELEPHONE ENCOUNTER
"T/c to patient. Patient states received another denial from insurance for Hyoscyamine. Patient states was informed there are \"other medications\" out there that will help. Patient also states medication was only around $17 for a 30 day supply at [facility] and would like another month supply to hold him over until his appt on 10/30  "

## 2024-10-22 ENCOUNTER — LAB (OUTPATIENT)
Dept: LAB | Facility: LAB | Age: 64
End: 2024-10-22
Payer: MEDICARE

## 2024-10-22 DIAGNOSIS — M10.9 GOUT, UNSPECIFIED: ICD-10-CM

## 2024-10-22 DIAGNOSIS — N18.2 CHRONIC KIDNEY DISEASE, STAGE 2 (MILD): ICD-10-CM

## 2024-10-22 DIAGNOSIS — Z12.5 ENCOUNTER FOR SCREENING FOR MALIGNANT NEOPLASM OF PROSTATE: Primary | ICD-10-CM

## 2024-10-22 DIAGNOSIS — E78.49 OTHER HYPERLIPIDEMIA: ICD-10-CM

## 2024-10-22 LAB
ALBUMIN SERPL BCP-MCNC: 4.2 G/DL (ref 3.4–5)
ALP SERPL-CCNC: 58 U/L (ref 33–136)
ALT SERPL W P-5'-P-CCNC: 16 U/L (ref 10–52)
ANION GAP SERPL CALC-SCNC: 12 MMOL/L (ref 10–20)
AST SERPL W P-5'-P-CCNC: 18 U/L (ref 9–39)
BASOPHILS # BLD AUTO: 0.04 X10*3/UL (ref 0–0.1)
BASOPHILS NFR BLD AUTO: 0.6 %
BILIRUB SERPL-MCNC: 0.8 MG/DL (ref 0–1.2)
BUN SERPL-MCNC: 30 MG/DL (ref 6–23)
CALCIUM SERPL-MCNC: 9 MG/DL (ref 8.6–10.3)
CHLORIDE SERPL-SCNC: 108 MMOL/L (ref 98–107)
CHOLEST SERPL-MCNC: 167 MG/DL (ref 0–199)
CHOLESTEROL/HDL RATIO: 2.7
CO2 SERPL-SCNC: 27 MMOL/L (ref 21–32)
CREAT SERPL-MCNC: 1.33 MG/DL (ref 0.5–1.3)
EGFRCR SERPLBLD CKD-EPI 2021: 59 ML/MIN/1.73M*2
EOSINOPHIL # BLD AUTO: 0.27 X10*3/UL (ref 0–0.7)
EOSINOPHIL NFR BLD AUTO: 4 %
ERYTHROCYTE [DISTWIDTH] IN BLOOD BY AUTOMATED COUNT: 13.1 % (ref 11.5–14.5)
GLUCOSE SERPL-MCNC: 98 MG/DL (ref 74–99)
HCT VFR BLD AUTO: 44 % (ref 41–52)
HDLC SERPL-MCNC: 61 MG/DL
HGB BLD-MCNC: 14.2 G/DL (ref 13.5–17.5)
IMM GRANULOCYTES # BLD AUTO: 0.01 X10*3/UL (ref 0–0.7)
IMM GRANULOCYTES NFR BLD AUTO: 0.1 % (ref 0–0.9)
LDLC SERPL CALC-MCNC: 94 MG/DL
LYMPHOCYTES # BLD AUTO: 2.23 X10*3/UL (ref 1.2–4.8)
LYMPHOCYTES NFR BLD AUTO: 32.7 %
MCH RBC QN AUTO: 31.4 PG (ref 26–34)
MCHC RBC AUTO-ENTMCNC: 32.3 G/DL (ref 32–36)
MCV RBC AUTO: 97 FL (ref 80–100)
MONOCYTES # BLD AUTO: 0.56 X10*3/UL (ref 0.1–1)
MONOCYTES NFR BLD AUTO: 8.2 %
NEUTROPHILS # BLD AUTO: 3.7 X10*3/UL (ref 1.2–7.7)
NEUTROPHILS NFR BLD AUTO: 54.4 %
NON HDL CHOLESTEROL: 106 MG/DL (ref 0–149)
NRBC BLD-RTO: 0 /100 WBCS (ref 0–0)
PLATELET # BLD AUTO: 151 X10*3/UL (ref 150–450)
POTASSIUM SERPL-SCNC: 4.6 MMOL/L (ref 3.5–5.3)
PROT SERPL-MCNC: 6.6 G/DL (ref 6.4–8.2)
PSA SERPL-MCNC: 1.19 NG/ML
RBC # BLD AUTO: 4.52 X10*6/UL (ref 4.5–5.9)
SODIUM SERPL-SCNC: 142 MMOL/L (ref 136–145)
TRIGL SERPL-MCNC: 59 MG/DL (ref 0–149)
URATE SERPL-MCNC: 6.4 MG/DL (ref 4–7.5)
VLDL: 12 MG/DL (ref 0–40)
WBC # BLD AUTO: 6.8 X10*3/UL (ref 4.4–11.3)

## 2024-10-22 PROCEDURE — 85025 COMPLETE CBC W/AUTO DIFF WBC: CPT

## 2024-10-22 PROCEDURE — 36415 COLL VENOUS BLD VENIPUNCTURE: CPT

## 2024-10-22 PROCEDURE — 80053 COMPREHEN METABOLIC PANEL: CPT

## 2024-10-22 PROCEDURE — 80061 LIPID PANEL: CPT

## 2024-10-22 PROCEDURE — 84550 ASSAY OF BLOOD/URIC ACID: CPT

## 2024-10-22 PROCEDURE — G0103 PSA SCREENING: HCPCS

## 2024-10-30 ENCOUNTER — APPOINTMENT (OUTPATIENT)
Dept: CARDIOLOGY | Facility: CLINIC | Age: 64
End: 2024-10-30
Payer: MEDICARE

## 2024-10-30 VITALS
HEART RATE: 73 BPM | SYSTOLIC BLOOD PRESSURE: 125 MMHG | WEIGHT: 235.6 LBS | HEIGHT: 70 IN | BODY MASS INDEX: 33.73 KG/M2 | DIASTOLIC BLOOD PRESSURE: 85 MMHG

## 2024-10-30 DIAGNOSIS — R55 NEAR SYNCOPE: ICD-10-CM

## 2024-10-30 PROCEDURE — 1036F TOBACCO NON-USER: CPT | Performed by: INTERNAL MEDICINE

## 2024-10-30 PROCEDURE — 1159F MED LIST DOCD IN RCRD: CPT | Performed by: INTERNAL MEDICINE

## 2024-10-30 PROCEDURE — 1157F ADVNC CARE PLAN IN RCRD: CPT | Performed by: INTERNAL MEDICINE

## 2024-10-30 PROCEDURE — 99213 OFFICE O/P EST LOW 20 MIN: CPT | Performed by: INTERNAL MEDICINE

## 2024-10-30 PROCEDURE — 3008F BODY MASS INDEX DOCD: CPT | Performed by: INTERNAL MEDICINE

## 2024-10-30 RX ORDER — HYOSCYAMINE SULFATE 0.12 MG/1
0.12 TABLET, ORALLY DISINTEGRATING ORAL DAILY
Qty: 30 TABLET | Refills: 1 | Status: SHIPPED | OUTPATIENT
Start: 2024-10-30 | End: 2024-10-30 | Stop reason: SDUPTHER

## 2024-10-30 RX ORDER — HYOSCYAMINE SULFATE 0.12 MG/1
0.12 TABLET, ORALLY DISINTEGRATING ORAL DAILY
Qty: 90 TABLET | Refills: 3 | Status: SHIPPED | OUTPATIENT
Start: 2024-10-30

## 2024-12-04 ENCOUNTER — OFFICE VISIT (OUTPATIENT)
Dept: PULMONOLOGY | Age: 64
End: 2024-12-04
Payer: MEDICARE

## 2024-12-04 VITALS
OXYGEN SATURATION: 97 % | SYSTOLIC BLOOD PRESSURE: 132 MMHG | WEIGHT: 235 LBS | DIASTOLIC BLOOD PRESSURE: 82 MMHG | BODY MASS INDEX: 31.87 KG/M2 | HEART RATE: 75 BPM

## 2024-12-04 DIAGNOSIS — G47.33 OSA ON CPAP: Primary | ICD-10-CM

## 2024-12-04 DIAGNOSIS — E66.9 OBESITY (BMI 30-39.9): ICD-10-CM

## 2024-12-04 PROCEDURE — 99214 OFFICE O/P EST MOD 30 MIN: CPT | Performed by: INTERNAL MEDICINE

## 2024-12-04 PROCEDURE — 3017F COLORECTAL CA SCREEN DOC REV: CPT | Performed by: INTERNAL MEDICINE

## 2024-12-04 PROCEDURE — G8484 FLU IMMUNIZE NO ADMIN: HCPCS | Performed by: INTERNAL MEDICINE

## 2024-12-04 PROCEDURE — G8427 DOCREV CUR MEDS BY ELIG CLIN: HCPCS | Performed by: INTERNAL MEDICINE

## 2024-12-04 PROCEDURE — G8417 CALC BMI ABV UP PARAM F/U: HCPCS | Performed by: INTERNAL MEDICINE

## 2024-12-04 PROCEDURE — 1123F ACP DISCUSS/DSCN MKR DOCD: CPT | Performed by: INTERNAL MEDICINE

## 2024-12-04 PROCEDURE — 4004F PT TOBACCO SCREEN RCVD TLK: CPT | Performed by: INTERNAL MEDICINE

## 2024-12-04 ASSESSMENT — ENCOUNTER SYMPTOMS
EYE ITCHING: 0
SORE THROAT: 0
VOMITING: 0
WHEEZING: 0
RHINORRHEA: 0
SHORTNESS OF BREATH: 0
VOICE CHANGE: 0
CHEST TIGHTNESS: 0
DIARRHEA: 0
NAUSEA: 0
COUGH: 0
ABDOMINAL PAIN: 0

## 2024-12-04 NOTE — PROGRESS NOTES
and visual disturbance.   Respiratory:  Negative for cough, chest tightness, shortness of breath and wheezing.    Cardiovascular: Negative.  Negative for chest pain, palpitations and leg swelling.   Gastrointestinal:  Negative for abdominal pain, diarrhea, nausea and vomiting.   Genitourinary:  Negative for difficulty urinating and hematuria.   Musculoskeletal:  Negative for arthralgias, joint swelling and myalgias.   Skin:  Negative for rash.   Allergic/Immunologic: Negative for environmental allergies.   Neurological:  Negative for dizziness, tremors, weakness and headaches.   Psychiatric/Behavioral:  Positive for sleep disturbance. Negative for behavioral problems.          :     Vitals:    12/04/24 0930   BP: 132/82   Site: Right Upper Arm   Position: Sitting   Cuff Size: Large Adult   Pulse: 75   SpO2: 97%   Weight: 106.6 kg (235 lb)     Wt Readings from Last 3 Encounters:   12/04/24 106.6 kg (235 lb)   07/03/24 105.2 kg (232 lb)   03/12/24 105.7 kg (233 lb)         Physical Exam  Constitutional:       Appearance: He is well-developed. He is obese.   HENT:      Head: Normocephalic and atraumatic.      Nose: Nose normal.      Mouth/Throat:      Comments: Mallampati  IV    Eyes:      Conjunctiva/sclera: Conjunctivae normal.      Pupils: Pupils are equal, round, and reactive to light.   Neck:      Thyroid: No thyromegaly.      Vascular: No JVD.      Trachea: No tracheal deviation.   Cardiovascular:      Rate and Rhythm: Normal rate and regular rhythm.      Heart sounds: No murmur heard.     No friction rub. No gallop.   Pulmonary:      Effort: Pulmonary effort is normal. No respiratory distress.      Breath sounds: Normal breath sounds. No wheezing or rales.   Chest:      Chest wall: No tenderness.   Abdominal:      General: There is no distension.   Musculoskeletal:         General: Normal range of motion.   Lymphadenopathy:      Cervical: No cervical adenopathy.   Skin:     General: Skin is warm and dry.

## 2025-02-07 ENCOUNTER — APPOINTMENT (OUTPATIENT)
Dept: ORTHOPEDIC SURGERY | Facility: CLINIC | Age: 65
End: 2025-02-07
Payer: MEDICARE

## 2025-02-25 ENCOUNTER — APPOINTMENT (OUTPATIENT)
Dept: ORTHOPEDIC SURGERY | Facility: CLINIC | Age: 65
End: 2025-02-25
Payer: MEDICARE

## (undated) DEVICE — WOUND SYSTEM, DEBRIDEMENT & CLEANING, O.R DUOPAK

## (undated) DEVICE — CUFF, TOURNIQUET, DUAL PORT, SNG BLADDER, 30 IN, PLC

## (undated) DEVICE — SOLUTION, INJECTION, USP, SODIUM CHLORIDE 0.9%, .9, NACL, 1000 ML, BAG

## (undated) DEVICE — DRESSING, MEPILEX BORDER, POST-OP AG, 4 X 14 IN

## (undated) DEVICE — TUBING, IRRIGATION, HIGH FLOW, HAND PIECE SET

## (undated) DEVICE — HOOD, SURGICAL, FLYTE, T7

## (undated) DEVICE — PADDING, CAST, SPECIALIST, 6 IN X 4 YD, STERILE

## (undated) DEVICE — BLADE, MAKO, SAGITTAL, STANDARD

## (undated) DEVICE — BLADE, PATELLA REAMER, W/PILOT HOLE, 32MM

## (undated) DEVICE — SUTURE, MONOCRYL, 3-0, 36 IN, CT-1, UNDYED

## (undated) DEVICE — STRAP, VELCRO, BODY, 4 X 60IN, NS

## (undated) DEVICE — IMMOBILIZER, KNEE, 19IN, ADJUSTABLE FOAM, W/ ELASTIC STRAPS

## (undated) DEVICE — GLOVE, SURGICAL, PROTEXIS PI , 7.5, PF, LF

## (undated) DEVICE — ADHESIVE, SKIN, LIQUIBAND EXCEED

## (undated) DEVICE — DRAPE, SHEET, THREE QUARTER, FAN FOLD, 57 X 77 IN

## (undated) DEVICE — WRAP, DEMAYO, LEG, STERILE

## (undated) DEVICE — COVER, MAYO STAND, W/PAD, 23 IN, DISPOSABLE, PLASTIC, LF, STERILE

## (undated) DEVICE — SUTURE, MONOCRYL, 4-0, 27 IN, PS-2, UNDYED

## (undated) DEVICE — BANDAGE, ELASTIC, 6 X 10YD, BEIGE, LF

## (undated) DEVICE — SOLUTION, IRRIGATION, STERILE WATER, 1000 ML, POUR BOTTLE

## (undated) DEVICE — STRAP, ARM BOARD, 32 X 1.5

## (undated) DEVICE — SYRINGE, 50 CC, LUER LOCK

## (undated) DEVICE — NEEDLE, SAFETY, 18 G X 1.5 IN

## (undated) DEVICE — MANIFOLD, 4 PORT NEPTUNE STANDARD

## (undated) DEVICE — PIN, BONE, 4MM X 140MM, STERILE

## (undated) DEVICE — TRACKING KIT, TM KNEE PROCEDURES, VIZADISC

## (undated) DEVICE — SUTURE, VICRYL, 1, 36 IN, V-34, VIOLET

## (undated) DEVICE — DRAIN, PENROSE, 5/8 X 12IN,  LF

## (undated) DEVICE — BLADE, GEN COATED 2.75, LF

## (undated) DEVICE — PREP, IODOPHOR, W/ALCOHOL, DURAPREP, W/APPLICATOR, 26 CC

## (undated) DEVICE — TIBIAL CHECKPOINT, STERILE

## (undated) DEVICE — DRAPE KIT, RIO

## (undated) DEVICE — Device

## (undated) DEVICE — BATH BLANKET STERILE

## (undated) DEVICE — TOWEL PACK 10-PK

## (undated) DEVICE — CEMENT, MIXEVAC III, 10S BOWL, KNEES